# Patient Record
Sex: MALE | Race: WHITE | Employment: FULL TIME | URBAN - METROPOLITAN AREA
[De-identification: names, ages, dates, MRNs, and addresses within clinical notes are randomized per-mention and may not be internally consistent; named-entity substitution may affect disease eponyms.]

---

## 2017-04-06 ENCOUNTER — OFFICE VISIT (OUTPATIENT)
Dept: WOUND CARE | Age: 57
End: 2017-04-06
Payer: COMMERCIAL

## 2017-04-06 ENCOUNTER — HOSPITAL ENCOUNTER (OUTPATIENT)
Dept: LAB | Age: 57
Discharge: HOME | End: 2017-04-06
Payer: COMMERCIAL

## 2017-04-06 VITALS
DIASTOLIC BLOOD PRESSURE: 90 MMHG | WEIGHT: 226.6 LBS | HEART RATE: 75 BPM | TEMPERATURE: 97.4 F | RESPIRATION RATE: 18 BRPM | BODY MASS INDEX: 30.69 KG/M2 | SYSTOLIC BLOOD PRESSURE: 163 MMHG | HEIGHT: 72 IN

## 2017-04-06 DIAGNOSIS — L97.411: ICD-10-CM

## 2017-04-06 DIAGNOSIS — A49.8 BACTERIAL INFECTION DUE TO PSEUDOMONAS: ICD-10-CM

## 2017-04-06 DIAGNOSIS — L97.411: Primary | ICD-10-CM

## 2017-04-06 PROCEDURE — 99212 OFFICE O/P EST SF 10 MIN: CPT | Performed by: PODIATRIST

## 2017-04-06 PROCEDURE — 87205 SMEAR GRAM STAIN: CPT

## 2017-04-06 PROCEDURE — 87070 CULTURE OTHR SPECIMN AEROBIC: CPT

## 2017-04-06 PROCEDURE — 87077 CULTURE AEROBIC IDENTIFY: CPT

## 2017-04-06 PROCEDURE — 87186 SC STD MICRODIL/AGAR DIL: CPT

## 2017-04-08 LAB
CULTURE: ABNORMAL
CULTURE: ABNORMAL
DIRECT EXAM: ABNORMAL
DIRECT EXAM: ABNORMAL
Lab: ABNORMAL
Lab: ABNORMAL
ORGANISM: ABNORMAL
SPECIMEN DESCRIPTION: ABNORMAL
SPECIMEN DESCRIPTION: ABNORMAL
STATUS: ABNORMAL

## 2017-04-11 RX ORDER — CIPROFLOXACIN 500 MG/1
500 TABLET, FILM COATED ORAL 2 TIMES DAILY
Qty: 20 TABLET | Refills: 0 | Status: SHIPPED | OUTPATIENT
Start: 2017-04-11 | End: 2017-04-21

## 2017-04-21 ENCOUNTER — HOSPITAL ENCOUNTER (OUTPATIENT)
Age: 57
Discharge: HOME | End: 2017-04-21
Payer: COMMERCIAL

## 2017-04-21 LAB
ALBUMIN SERPL-MCNC: 4.2 G/DL (ref 3.5–5.2)
ALBUMIN/GLOBULIN RATIO: 1.8 (ref 1–2.5)
ALP BLD-CCNC: 76 U/L (ref 40–129)
ALT SERPL-CCNC: 28 U/L (ref 5–41)
ANION GAP SERPL CALCULATED.3IONS-SCNC: 12 MMOL/L (ref 9–17)
AST SERPL-CCNC: 18 U/L
BILIRUB SERPL-MCNC: 0.38 MG/DL (ref 0.3–1.2)
BUN BLDV-MCNC: 20 MG/DL (ref 6–20)
BUN/CREAT BLD: 22 (ref 9–20)
CALCIUM SERPL-MCNC: 9.1 MG/DL (ref 8.6–10.4)
CHLORIDE BLD-SCNC: 105 MMOL/L (ref 98–107)
CHOLESTEROL/HDL RATIO: 4.4
CHOLESTEROL: 161 MG/DL
CO2: 27 MMOL/L (ref 20–31)
CREAT SERPL-MCNC: 0.91 MG/DL (ref 0.7–1.2)
GFR AFRICAN AMERICAN: >60 ML/MIN
GFR NON-AFRICAN AMERICAN: >60 ML/MIN
GFR SERPL CREATININE-BSD FRML MDRD: ABNORMAL ML/MIN/{1.73_M2}
GFR SERPL CREATININE-BSD FRML MDRD: ABNORMAL ML/MIN/{1.73_M2}
GLUCOSE BLD-MCNC: 114 MG/DL (ref 70–99)
HDLC SERPL-MCNC: 37 MG/DL
LDL CHOLESTEROL: 81 MG/DL (ref 0–130)
POTASSIUM SERPL-SCNC: 4.5 MMOL/L (ref 3.7–5.3)
PROSTATE SPECIFIC ANTIGEN: 4.14 UG/L
SODIUM BLD-SCNC: 144 MMOL/L (ref 135–144)
TOTAL PROTEIN: 6.5 G/DL (ref 6.4–8.3)
TRIGL SERPL-MCNC: 217 MG/DL
VLDLC SERPL CALC-MCNC: ABNORMAL MG/DL (ref 1–30)

## 2017-04-21 PROCEDURE — G0103 PSA SCREENING: HCPCS

## 2017-04-21 PROCEDURE — 80061 LIPID PANEL: CPT

## 2017-04-21 PROCEDURE — 80053 COMPREHEN METABOLIC PANEL: CPT

## 2017-04-21 PROCEDURE — 36415 COLL VENOUS BLD VENIPUNCTURE: CPT

## 2017-07-06 ENCOUNTER — HOSPITAL ENCOUNTER (OUTPATIENT)
Dept: WOUND CARE | Age: 57
Discharge: HOME | End: 2017-07-06
Payer: COMMERCIAL

## 2017-07-06 VITALS
BODY MASS INDEX: 30.69 KG/M2 | SYSTOLIC BLOOD PRESSURE: 158 MMHG | HEART RATE: 76 BPM | HEIGHT: 72 IN | RESPIRATION RATE: 18 BRPM | WEIGHT: 226.6 LBS | TEMPERATURE: 97.2 F | DIASTOLIC BLOOD PRESSURE: 84 MMHG

## 2017-07-06 DIAGNOSIS — L97.411: ICD-10-CM

## 2017-07-06 PROCEDURE — 97597 DBRDMT OPN WND 1ST 20 CM/<: CPT

## 2017-07-06 PROCEDURE — 97597 DBRDMT OPN WND 1ST 20 CM/<: CPT | Performed by: PODIATRIST

## 2017-07-06 PROCEDURE — A6197 ALGINATE DRSG >16 <=48 SQ IN: HCPCS

## 2017-07-20 ENCOUNTER — HOSPITAL ENCOUNTER (OUTPATIENT)
Dept: WOUND CARE | Age: 57
Discharge: HOME | End: 2017-07-20
Payer: COMMERCIAL

## 2017-07-20 VITALS
TEMPERATURE: 98 F | HEART RATE: 73 BPM | DIASTOLIC BLOOD PRESSURE: 79 MMHG | RESPIRATION RATE: 16 BRPM | WEIGHT: 227.1 LBS | SYSTOLIC BLOOD PRESSURE: 129 MMHG | BODY MASS INDEX: 30.8 KG/M2

## 2017-07-20 PROCEDURE — 97597 DBRDMT OPN WND 1ST 20 CM/<: CPT | Performed by: PODIATRIST

## 2017-07-20 PROCEDURE — A6197 ALGINATE DRSG >16 <=48 SQ IN: HCPCS

## 2017-07-20 PROCEDURE — 97597 DBRDMT OPN WND 1ST 20 CM/<: CPT

## 2017-07-20 RX ORDER — ATORVASTATIN CALCIUM 20 MG/1
20 TABLET, FILM COATED ORAL DAILY
COMMUNITY

## 2017-07-20 ASSESSMENT — PAIN SCALES - GENERAL: PAINLEVEL_OUTOF10: 2

## 2017-08-03 ENCOUNTER — HOSPITAL ENCOUNTER (OUTPATIENT)
Dept: WOUND CARE | Age: 57
Discharge: HOME | End: 2017-08-03
Payer: COMMERCIAL

## 2017-08-03 VITALS
BODY MASS INDEX: 30.8 KG/M2 | DIASTOLIC BLOOD PRESSURE: 92 MMHG | HEIGHT: 72 IN | RESPIRATION RATE: 18 BRPM | TEMPERATURE: 97.5 F | SYSTOLIC BLOOD PRESSURE: 154 MMHG | WEIGHT: 227.4 LBS | HEART RATE: 72 BPM

## 2017-08-03 PROCEDURE — 99213 OFFICE O/P EST LOW 20 MIN: CPT

## 2017-08-03 PROCEDURE — 99213 OFFICE O/P EST LOW 20 MIN: CPT | Performed by: PODIATRIST

## 2017-08-03 PROCEDURE — 2720000010 HC SURG SUPPLY STERILE

## 2017-08-03 ASSESSMENT — PAIN SCALES - GENERAL: PAINLEVEL_OUTOF10: 0

## 2017-08-11 ENCOUNTER — TELEPHONE (OUTPATIENT)
Dept: WOUND CARE | Age: 57
End: 2017-08-11

## 2017-08-28 ENCOUNTER — HOSPITAL ENCOUNTER (OUTPATIENT)
Age: 57
Discharge: HOME | End: 2017-08-28
Payer: COMMERCIAL

## 2017-08-28 LAB
ALBUMIN SERPL-MCNC: 3.9 G/DL (ref 3.5–5.2)
ALBUMIN/GLOBULIN RATIO: 1.4 (ref 1–2.5)
ALP BLD-CCNC: 78 U/L (ref 40–129)
ALT SERPL-CCNC: 27 U/L (ref 5–41)
ANION GAP SERPL CALCULATED.3IONS-SCNC: 12 MMOL/L (ref 9–17)
AST SERPL-CCNC: 18 U/L
BILIRUB SERPL-MCNC: 0.25 MG/DL (ref 0.3–1.2)
BUN BLDV-MCNC: 18 MG/DL (ref 6–20)
BUN/CREAT BLD: 23 (ref 9–20)
CALCIUM SERPL-MCNC: 9.3 MG/DL (ref 8.6–10.4)
CHLORIDE BLD-SCNC: 107 MMOL/L (ref 98–107)
CHOLESTEROL/HDL RATIO: 5.1
CHOLESTEROL: 162 MG/DL
CO2: 24 MMOL/L (ref 20–31)
CREAT SERPL-MCNC: 0.8 MG/DL (ref 0.7–1.2)
ESTIMATED AVERAGE GLUCOSE: 108 MG/DL
GFR AFRICAN AMERICAN: >60 ML/MIN
GFR NON-AFRICAN AMERICAN: >60 ML/MIN
GFR SERPL CREATININE-BSD FRML MDRD: ABNORMAL ML/MIN/{1.73_M2}
GFR SERPL CREATININE-BSD FRML MDRD: ABNORMAL ML/MIN/{1.73_M2}
GLUCOSE BLD-MCNC: 83 MG/DL (ref 70–99)
HBA1C MFR BLD: 5.4 % (ref 4.8–5.9)
HDLC SERPL-MCNC: 32 MG/DL
LDL CHOLESTEROL: 77 MG/DL (ref 0–130)
POTASSIUM SERPL-SCNC: 4.1 MMOL/L (ref 3.7–5.3)
SODIUM BLD-SCNC: 143 MMOL/L (ref 135–144)
TOTAL PROTEIN: 6.7 G/DL (ref 6.4–8.3)
TRIGL SERPL-MCNC: 263 MG/DL
VLDLC SERPL CALC-MCNC: ABNORMAL MG/DL (ref 1–30)

## 2017-08-28 PROCEDURE — 80053 COMPREHEN METABOLIC PANEL: CPT

## 2017-08-28 PROCEDURE — 36415 COLL VENOUS BLD VENIPUNCTURE: CPT

## 2017-08-28 PROCEDURE — 83036 HEMOGLOBIN GLYCOSYLATED A1C: CPT

## 2017-08-28 PROCEDURE — 80061 LIPID PANEL: CPT

## 2017-09-14 ENCOUNTER — HOSPITAL ENCOUNTER (OUTPATIENT)
Dept: WOUND CARE | Age: 57
Discharge: HOME | End: 2017-09-14
Payer: COMMERCIAL

## 2017-09-14 VITALS
WEIGHT: 231.2 LBS | RESPIRATION RATE: 16 BRPM | SYSTOLIC BLOOD PRESSURE: 149 MMHG | TEMPERATURE: 97.4 F | HEIGHT: 72 IN | BODY MASS INDEX: 31.31 KG/M2 | HEART RATE: 72 BPM | DIASTOLIC BLOOD PRESSURE: 84 MMHG

## 2017-09-14 PROCEDURE — 99213 OFFICE O/P EST LOW 20 MIN: CPT

## 2017-09-14 PROCEDURE — A6197 ALGINATE DRSG >16 <=48 SQ IN: HCPCS

## 2017-09-14 PROCEDURE — 99212 OFFICE O/P EST SF 10 MIN: CPT | Performed by: PODIATRIST

## 2017-09-14 ASSESSMENT — PAIN SCALES - GENERAL: PAINLEVEL_OUTOF10: 3

## 2017-11-09 ENCOUNTER — HOSPITAL ENCOUNTER (OUTPATIENT)
Age: 57
Discharge: HOME | End: 2017-11-09
Payer: COMMERCIAL

## 2017-11-09 ENCOUNTER — HOSPITAL ENCOUNTER (OUTPATIENT)
Dept: WOUND CARE | Age: 57
Discharge: HOME | End: 2017-11-09
Payer: COMMERCIAL

## 2017-11-09 ENCOUNTER — HOSPITAL ENCOUNTER (OUTPATIENT)
Dept: LAB | Age: 57
Discharge: HOME | End: 2017-11-09
Payer: COMMERCIAL

## 2017-11-09 ENCOUNTER — HOSPITAL ENCOUNTER (OUTPATIENT)
Dept: GENERAL RADIOLOGY | Age: 57
Discharge: HOME | End: 2017-11-09
Payer: COMMERCIAL

## 2017-11-09 VITALS
WEIGHT: 238 LBS | BODY MASS INDEX: 32.23 KG/M2 | HEART RATE: 83 BPM | HEIGHT: 72 IN | SYSTOLIC BLOOD PRESSURE: 151 MMHG | TEMPERATURE: 97.7 F | RESPIRATION RATE: 18 BRPM | DIASTOLIC BLOOD PRESSURE: 86 MMHG

## 2017-11-09 DIAGNOSIS — L97.411: ICD-10-CM

## 2017-11-09 DIAGNOSIS — L97.411: Primary | ICD-10-CM

## 2017-11-09 PROCEDURE — 87186 SC STD MICRODIL/AGAR DIL: CPT

## 2017-11-09 PROCEDURE — 87077 CULTURE AEROBIC IDENTIFY: CPT

## 2017-11-09 PROCEDURE — 99213 OFFICE O/P EST LOW 20 MIN: CPT | Performed by: PODIATRIST

## 2017-11-09 PROCEDURE — 73630 X-RAY EXAM OF FOOT: CPT

## 2017-11-09 PROCEDURE — 99213 OFFICE O/P EST LOW 20 MIN: CPT

## 2017-11-09 PROCEDURE — 87070 CULTURE OTHR SPECIMN AEROBIC: CPT

## 2017-11-09 PROCEDURE — 87205 SMEAR GRAM STAIN: CPT

## 2017-11-09 ASSESSMENT — PAIN DESCRIPTION - PAIN TYPE: TYPE: CHRONIC PAIN

## 2017-11-09 ASSESSMENT — PAIN DESCRIPTION - ORIENTATION: ORIENTATION: RIGHT

## 2017-11-09 ASSESSMENT — PAIN DESCRIPTION - FREQUENCY: FREQUENCY: INTERMITTENT

## 2017-11-09 ASSESSMENT — PAIN DESCRIPTION - DESCRIPTORS: DESCRIPTORS: ACHING

## 2017-11-09 ASSESSMENT — PAIN DESCRIPTION - LOCATION: LOCATION: FOOT

## 2017-11-09 NOTE — PLAN OF CARE
Problem: Smoking cessation:  Goal: Ability to formulate a plan to maintain a tobacco-free life will be supported  Ability to formulate a plan to maintain a tobacco-free life will be supported   Outcome: Ongoing

## 2017-11-10 NOTE — PROGRESS NOTES
Diabetic Quality Measure Benchmarks:    FSBS:  RESULT did not do           Hemoglobin A1c Controlled and is < 8%     Yes  LDL is <100        5.4    In 8/17                                       Yes  Blood Pressure is under < 140/90               No  Tobacco Non use                                         No  Aspirin Use                                                    Yes    Information sent to PCP                                Yes    Instructions given to Patient regarding follow up and or education      Yes
Wound Management Medical Nutrition Therapy Follow-Up Chart Review    Wt Readings from Last 3 Encounters:   11/09/17 238 lb (108 kg)   09/14/17 231 lb 3.2 oz (104.9 kg)   08/03/17 227 lb 6.4 oz (103.1 kg)     Significant Weight Change- No   Unintended- Yes    Patient atorvastatin and ibuprofen    Labs -   Lab Results   Component Value Date    LABALBU 3.9 08/28/2017    GLUCOSE 83 08/28/2017    LABA1C 5.4 08/28/2017     Other Labs - none    Glucometer readings at home are running unknownd. Diabetes Control- adequate per A1c    Dietary Recall Reveals- unable to assess as chart review. Medical Nutrition Therapy Discussed- No vitamin noted. In July when I spoke to him, I had encouraged use of MVI with minerals, protein foods, and increased water consumption. Nutrition Therapy Recommendations- same as above.         Follow-up- quarterly    Melanie Adams RDN, LD 11/9/2017 9:57 AM      PQRS Measure: #1(Diabetes: Hemoglobin A1C Poor Control) - no   PQRS Measure #130 (Documentation of Current Medications in Medical Record) - yes
numbness/tingling,claudication,smoking                                          Positive for amputation ; partial of foot                                                              Frostbite                                                              Pain, increasing after fall    Multidisciplinary assessment by members of the wound care team is carried out, including vitals and check of review of systems and accepted. PE:   VSS, Afebrile, NAD, A&O x 3, ambulatory ; bipedal , plantigrade , non propulsive / steppage   RLE: TMA stump -- FT ulceration ; increased with erosion and biofilm                                     Low grade erythema                                     +PMT on direct exam and treatment                                     TWV increased 20%                                    Size documentation  / photo comparison             X-ray -- no acute s/s Fx , infection , degeneration                           No interval changes v. Previous 9- views       Wound Care Documentation   Wound 07/06/17 Foot Right #1 right lateral foot (Active)   Wound Image   11/9/2017  8:38 AM   Wound Type Wound 11/9/2017  8:38 AM   Wound Burn 11/9/2017  8:38 AM   Dressing Status Old drainage 11/9/2017  8:38 AM   Dressing Changed Changed/New 11/9/2017  8:56 AM   Dressing/Treatment Dry dressing 11/9/2017  8:38 AM   Wound Cleansed Rinsed/Irrigated with saline 11/9/2017  8:38 AM   Wound Length (cm) 5.7 cm 11/9/2017  8:38 AM   Wound Width (cm) 3 cm 11/9/2017  8:38 AM   Wound Depth (cm)  0.7 11/9/2017  8:38 AM   Calculated Wound Size (cm^2) (l*w) 17.1 cm^2 11/9/2017  8:38 AM   Change in Wound Size % (l*w) 5.79 11/9/2017  8:38 AM   Wound Assessment Edges well approximated;Red 11/9/2017  8:38 AM   Margins Attached edges; Defined edges 11/9/2017  8:38 AM   Pearl-wound Assessment Edema;Blanchable erythema 11/9/2017  8:38 AM   [place]%Wound Bed 75 11/9/2017  8:38 AM   Red%Wound Bed 100 9/14/2017  9:07 AM   Yellow%Wound Bed 25

## 2017-11-30 ENCOUNTER — HOSPITAL ENCOUNTER (OUTPATIENT)
Dept: WOUND CARE | Age: 57
Discharge: HOME | End: 2017-11-30
Payer: COMMERCIAL

## 2017-11-30 VITALS
BODY MASS INDEX: 32.02 KG/M2 | TEMPERATURE: 98.5 F | RESPIRATION RATE: 18 BRPM | WEIGHT: 236.1 LBS | HEART RATE: 75 BPM | DIASTOLIC BLOOD PRESSURE: 75 MMHG | SYSTOLIC BLOOD PRESSURE: 147 MMHG

## 2017-11-30 DIAGNOSIS — A49.8 BACTERIAL INFECTION DUE TO PSEUDOMONAS: ICD-10-CM

## 2017-11-30 PROCEDURE — 97597 DBRDMT OPN WND 1ST 20 CM/<: CPT | Performed by: PODIATRIST

## 2017-11-30 PROCEDURE — 97597 DBRDMT OPN WND 1ST 20 CM/<: CPT

## 2017-11-30 RX ORDER — CIPROFLOXACIN 500 MG/1
500 TABLET, FILM COATED ORAL 2 TIMES DAILY
Qty: 20 TABLET | Refills: 0 | Status: SHIPPED | OUTPATIENT
Start: 2017-11-30 | End: 2017-12-10

## 2017-11-30 ASSESSMENT — PAIN SCALES - GENERAL: PAINLEVEL_OUTOF10: 2

## 2017-11-30 NOTE — PROGRESS NOTES
Miranda J[facility] 37   Progress Note and Procedure Note      [address] [facility] RECORD NUMBER:  [id number]  AGE: 62 y.o. GENDER: male  : 1960  EPISODE DATE:  2017    Subjective:     Chief Complaint   Patient presents with    Wound Check     Right lateral foot    pain in area      HISTORY of PRESENT ILLNESS HPI     Latia Dugan is a 62 y.o. male who presents today for wound/ulcer evaluation. History of Wound Context: Right foot ; TMA stump -- > 3 months ; recurrent  / refractory                                              Some question of local perfusion - effective  ; CO2 @ work                                              Local Ag wound care daily                                              Hx frostbite  ; TMA                                             Hx autonomic sympathectomy   Wound/Ulcer Pain Timing/Severity: intermittent, moderate  Quality of pain: sharp, burning, throbbing  Severity:  3 / 10   Modifying Factors: Pain worsens with walking and Pain worsens with direct shoe pressure   Associated Signs/Symptoms: erythema, drainage, odor and pain    Ulcer Identification:  Ulcer Type: specialized post frostbite and neuropathic   Contributing Factors: decreased tissue oxygenation and sympathectomy     Wound: Blister        PAST MEDICAL HISTORY        Diagnosis Date    Autonomic nervous system disorder     Frostbite     History of femoral angiogram        PAST SURGICAL HISTORY    Past Surgical History:   Procedure Laterality Date    FEMORAL BYPASS  2016    FOOT AMPUTATION THROUGH METATARSAL      times two    SYMPATHECTOMY         FAMILY HISTORY    History reviewed. No pertinent family history.     SOCIAL HISTORY    Social History   Substance Use Topics    Smoking status: Former Smoker     Packs/day: 0.50     Quit date: 2016    Smokeless tobacco: Never Used    Alcohol use Not on file       ALLERGIES    No Known Allergies    MEDICATIONS    Current Outpatient Prescriptions on File Prior to Encounter   Medication Sig Dispense Refill    atorvastatin (LIPITOR) 20 MG tablet Take 20 mg by mouth daily      ibuprofen (ADVIL;MOTRIN) 800 MG tablet Take 800 mg by mouth daily        No current facility-administered medications on file prior to encounter. REVIEW OF SYSTEMS    Pertinent items are noted in HPI.   -claudication , night or rest pain , DVT, angina    +TMA , +frostbite , +autonomic sympathectomy     Objective:      BP (!) 147/75 Comment: 2nd reading-Pt instructed to f/u with PCP  Pulse 75   Temp 98.5 °F (36.9 °C) (Tympanic)   Resp 18   Wt 236 lb 1.6 oz (107.1 kg)   BMI 32.02 kg/m²     Wt Readings from Last 3 Encounters:   11/30/17 236 lb 1.6 oz (107.1 kg)   11/09/17 238 lb (108 kg)   09/14/17 231 lb 3.2 oz (104.9 kg)       PHYSICAL EXAM    General Appearance: alert and oriented to person, place and time, well-developed and well nourished and in mild distress, with exam (aniety)  Musculoskeletal: TMA B/L   Neurologic: reflexes normal and symmetric, sensation to light touch intact, vibratory sensation normal and Babinski sign negative                     Abnormal sweating mechanism   Skin : residual FT ulceration , with biofilm , granular bed underneath                                                   +macerated edges                                                   +PMT on direct exam and treatment                                                   TWV decreased < 5 %                                                   Size documentation / photo comparison   Labs : culture -- light Pseudomonas        Assessment:    FT ulceration of right foot ; stagnant    microbial isolate noted   Complicating features of current work environment , frostbite hx and Autonomic sympathectomy     Patient Active Problem List   Diagnosis Code    Chronic ulcer of right midfoot limited to breakdown of skin ([facility] [address] L97.411    Bacterial infection due to Pseudomonas B96.5     REC: 11/30/2017  8:55 AM   Debridement per physician None 11/9/2017  8:56 AM   Time out Yes 7/20/2017  8:50 AM   Procedural Pain 0 7/6/2017 10:32 AM   Post procedural Pain 0 7/6/2017 10:32 AM   Number of days: 147       Percent of Wound(s)/Ulcer(s) Debrided: 100%    Total Surface Area Debrided:  15.2 sq cm       Diabetic/Pressure/Non Pressure Ulcers only:  Ulcer: Non-Pressure ulcer, limited to breakdown of skin      Estimated Blood Loss:  Minimal    Hemostasis Achieved:  by pressure    Procedural Pain:  3  / 10     Post Procedural Pain:  1 / 10     Response to treatment:  With complaints of pain. Plan:     Treatment Note please see attached Discharge Instructions    Written patient dismissal instructions given to patient and signed by patient or POA. Discharge Instructions         Wound Management Patient Discharge Instructions    CALL [phone] for questions regarding care of your wounds. OFFICE HOURS ARE WED AND Thursday 8 A. M. TO 4:30 P. M. And subject to change without notice    Discharge instructions for the patient's plan of care. Wound care: Cleanse area with white vinegar and apply cutimed dressing to right lateral foot. May cover with gauze dressing as needed. Change dressing daily. May dab with white vinegar and leave open to air in the evening. Cipro 500 mg take one tablet 2 times daily for 10 days. Return to clinic in 2 weeks.     Next appointment:  Future Appointments  Date Time Provider Anatoliy Espinoza   12/14/2017 9:15 AM Thong Mace DPM [facility] [place]         Comments:                  Electronically signed by Thong Mace DPM on 11/30/2017 at 9:28 AM

## 2017-12-14 ENCOUNTER — HOSPITAL ENCOUNTER (OUTPATIENT)
Dept: WOUND CARE | Age: 57
Discharge: HOME | End: 2017-12-14
Payer: COMMERCIAL

## 2017-12-14 VITALS
HEART RATE: 79 BPM | TEMPERATURE: 98.6 F | SYSTOLIC BLOOD PRESSURE: 145 MMHG | RESPIRATION RATE: 16 BRPM | WEIGHT: 236.8 LBS | BODY MASS INDEX: 32.12 KG/M2 | DIASTOLIC BLOOD PRESSURE: 80 MMHG

## 2017-12-14 PROCEDURE — 97597 DBRDMT OPN WND 1ST 20 CM/<: CPT

## 2017-12-14 PROCEDURE — 97597 DBRDMT OPN WND 1ST 20 CM/<: CPT | Performed by: PODIATRIST

## 2017-12-14 ASSESSMENT — PAIN SCALES - GENERAL: PAINLEVEL_OUTOF10: 2

## 2017-12-14 NOTE — PROGRESS NOTES
Yes    Pain Control:         Debridement:Non-excisional Debridement    Using curette, #15 blade scalpel and forceps the wound(s)/ulcer(s) was/were sharply debrided down through and including the removal of epidermis and dermis. Devitalized Tissue Debrided:  biofilm    Pre Debridement Measurements:  Are located in the [facility]  Documentation Flow Sheet    Wound/Ulcer #: 1    Post Debridement Measurements:  Wound/Ulcer Descriptions are Pre Debridement except measurements:    Wound 07/06/17 Foot Right #1 right lateral foot (Active)   Wound Image   12/14/2017  9:33 AM   Wound Type Wound 12/14/2017  9:02 AM   Wound Burn 11/9/2017  8:38 AM   Dressing Status Old drainage 12/14/2017  9:02 AM   Dressing Changed Changed/New 12/14/2017  9:02 AM   Dressing/Treatment Other (Comment) 12/14/2017  9:33 AM   Wound Cleansed Rinsed/Irrigated with saline 11/9/2017  8:38 AM   Wound Length (cm) 2.2 cm 12/14/2017  9:02 AM   Wound Width (cm) 5 cm 12/14/2017  9:02 AM   Wound Depth (cm)  1 12/14/2017  9:02 AM   Calculated Wound Size (cm^2) (l*w) 11 cm^2 12/14/2017  9:02 AM   Change in Wound Size % (l*w) 39.39 12/14/2017  9:02 AM   Wound Assessment Red 12/14/2017  9:02 AM   Drainage Amount Small 12/14/2017  9:02 AM   Drainage Description Serosanguinous 12/14/2017  9:02 AM   Odor None 12/14/2017  9:02 AM   Margins Attached edges; Defined edges 11/9/2017  8:38 AM   Pearl-wound Assessment Calloused;Clean;Dry 12/14/2017  9:02 AM   [place]%Wound Bed 75 11/9/2017  8:38 AM   Red%Wound Bed 100 9/14/2017  9:07 AM   Yellow%Wound Bed 25 11/9/2017  8:38 AM   Culture Taken No 12/14/2017  9:33 AM   Debridement per physician None 11/9/2017  8:56 AM   Time out Yes 7/20/2017  8:50 AM   Procedural Pain 0 7/6/2017 10:32 AM   Post procedural Pain 0 7/6/2017 10:32 AM   Number of days: 161       Percent of Wound(s)/Ulcer(s) Debrided: 90%    Total Surface Area Debrided:  10.5 sq cm       Diabetic/Pressure/Non Pressure Ulcers only:  Ulcer: Non-Pressure ulcer, limited to breakdown of skin      Estimated Blood Loss:  Minimal    Hemostasis Achieved:  by pressure    Procedural Pain:  2  / 10     Post Procedural Pain:  0 / 10     Response to treatment:  With complaints of pain. Plan:     Treatment Note please see attached Discharge Instructions    Written patient dismissal instructions given to patient and signed by patient or POA. Discharge Instructions       Wound Management Patient Discharge Instructions    CALL [phone] for questions regarding care of your wounds. OFFICE HOURS ARE WED AND Thursday 8 A. M. TO 4:30 P. M. And subject to change without notice    Discharge instructions for the patient's plan of care. Wound care: Cleanse area with white vinegar or mild bleach solution. Apply cutimed sorbact to right lateral foot. Cover with dry gauze dressing. Change dressing daily or as needed. Return to clinic in 7 weeks. Next appointment: Thursday, February 1, 2018 @ 9:00 am  No future appointments.       Comments:                  Electronically signed by Maverick Potter DPM on 12/14/2017 at 10:15 AM

## 2018-01-29 ENCOUNTER — HOSPITAL ENCOUNTER (OUTPATIENT)
Age: 58
Discharge: HOME | End: 2018-01-29
Payer: COMMERCIAL

## 2018-01-29 LAB
ALBUMIN SERPL-MCNC: 4 G/DL (ref 3.5–5.2)
ALBUMIN/GLOBULIN RATIO: 1.6 (ref 1–2.5)
ALP BLD-CCNC: 78 U/L (ref 40–129)
ALT SERPL-CCNC: 25 U/L (ref 5–41)
ANION GAP SERPL CALCULATED.3IONS-SCNC: 12 MMOL/L (ref 9–17)
AST SERPL-CCNC: 17 U/L
BILIRUB SERPL-MCNC: 0.22 MG/DL (ref 0.3–1.2)
BUN BLDV-MCNC: 19 MG/DL (ref 6–20)
BUN/CREAT BLD: 20 (ref 9–20)
CALCIUM SERPL-MCNC: 9.1 MG/DL (ref 8.6–10.4)
CHLORIDE BLD-SCNC: 102 MMOL/L (ref 98–107)
CHOLESTEROL/HDL RATIO: 4.9
CHOLESTEROL: 162 MG/DL
CO2: 28 MMOL/L (ref 20–31)
CREAT SERPL-MCNC: 0.97 MG/DL (ref 0.7–1.2)
GFR AFRICAN AMERICAN: >60 ML/MIN
GFR NON-AFRICAN AMERICAN: >60 ML/MIN
GFR SERPL CREATININE-BSD FRML MDRD: ABNORMAL ML/MIN/{1.73_M2}
GFR SERPL CREATININE-BSD FRML MDRD: ABNORMAL ML/MIN/{1.73_M2}
GLUCOSE BLD-MCNC: 113 MG/DL (ref 70–99)
HDLC SERPL-MCNC: 33 MG/DL
LDL CHOLESTEROL: 88 MG/DL (ref 0–130)
POTASSIUM SERPL-SCNC: 4.4 MMOL/L (ref 3.7–5.3)
PROSTATE SPECIFIC ANTIGEN: 5.25 UG/L
SODIUM BLD-SCNC: 142 MMOL/L (ref 135–144)
TOTAL PROTEIN: 6.5 G/DL (ref 6.4–8.3)
TRIGL SERPL-MCNC: 204 MG/DL
VLDLC SERPL CALC-MCNC: ABNORMAL MG/DL (ref 1–30)

## 2018-01-29 PROCEDURE — 80053 COMPREHEN METABOLIC PANEL: CPT

## 2018-01-29 PROCEDURE — 36415 COLL VENOUS BLD VENIPUNCTURE: CPT

## 2018-01-29 PROCEDURE — 80061 LIPID PANEL: CPT

## 2018-01-29 PROCEDURE — G0103 PSA SCREENING: HCPCS

## 2018-02-01 ENCOUNTER — HOSPITAL ENCOUNTER (OUTPATIENT)
Dept: WOUND CARE | Age: 58
Discharge: HOME | End: 2018-02-01
Payer: COMMERCIAL

## 2018-02-01 VITALS
BODY MASS INDEX: 31.94 KG/M2 | HEIGHT: 72 IN | RESPIRATION RATE: 18 BRPM | SYSTOLIC BLOOD PRESSURE: 137 MMHG | HEART RATE: 70 BPM | TEMPERATURE: 97.8 F | WEIGHT: 235.8 LBS | DIASTOLIC BLOOD PRESSURE: 82 MMHG

## 2018-02-01 PROCEDURE — 97597 DBRDMT OPN WND 1ST 20 CM/<: CPT

## 2018-02-01 PROCEDURE — 97597 DBRDMT OPN WND 1ST 20 CM/<: CPT | Performed by: PODIATRIST

## 2018-02-01 ASSESSMENT — PAIN SCALES - GENERAL: PAINLEVEL_OUTOF10: 0

## 2018-02-19 ENCOUNTER — HOSPITAL ENCOUNTER (OUTPATIENT)
Age: 58
Discharge: HOME | End: 2018-02-19
Payer: COMMERCIAL

## 2018-02-19 LAB
ALBUMIN SERPL-MCNC: 4 G/DL (ref 3.5–5.2)
ALBUMIN/GLOBULIN RATIO: 1.5 (ref 1–2.5)
ALP BLD-CCNC: 72 U/L (ref 40–129)
ALT SERPL-CCNC: 20 U/L (ref 5–41)
ANION GAP SERPL CALCULATED.3IONS-SCNC: 11 MMOL/L (ref 9–17)
AST SERPL-CCNC: 16 U/L
BILIRUB SERPL-MCNC: 0.24 MG/DL (ref 0.3–1.2)
BUN BLDV-MCNC: 22 MG/DL (ref 6–20)
BUN/CREAT BLD: 25 (ref 9–20)
CALCIUM SERPL-MCNC: 9.6 MG/DL (ref 8.6–10.4)
CHLORIDE BLD-SCNC: 105 MMOL/L (ref 98–107)
CHOLESTEROL/HDL RATIO: 5.3
CHOLESTEROL: 170 MG/DL
CO2: 27 MMOL/L (ref 20–31)
CREAT SERPL-MCNC: 0.87 MG/DL (ref 0.7–1.2)
GFR AFRICAN AMERICAN: >60 ML/MIN
GFR NON-AFRICAN AMERICAN: >60 ML/MIN
GFR SERPL CREATININE-BSD FRML MDRD: ABNORMAL ML/MIN/{1.73_M2}
GFR SERPL CREATININE-BSD FRML MDRD: ABNORMAL ML/MIN/{1.73_M2}
GLUCOSE BLD-MCNC: 87 MG/DL (ref 70–99)
HDLC SERPL-MCNC: 32 MG/DL
LDL CHOLESTEROL: 84 MG/DL (ref 0–130)
POTASSIUM SERPL-SCNC: 4.1 MMOL/L (ref 3.7–5.3)
PROSTATE SPECIFIC ANTIGEN: 5.54 UG/L
SODIUM BLD-SCNC: 143 MMOL/L (ref 135–144)
TOTAL PROTEIN: 6.6 G/DL (ref 6.4–8.3)
TRIGL SERPL-MCNC: 268 MG/DL
VLDLC SERPL CALC-MCNC: ABNORMAL MG/DL (ref 1–30)

## 2018-02-19 PROCEDURE — 36415 COLL VENOUS BLD VENIPUNCTURE: CPT

## 2018-02-19 PROCEDURE — 80053 COMPREHEN METABOLIC PANEL: CPT

## 2018-02-19 PROCEDURE — 84153 ASSAY OF PSA TOTAL: CPT

## 2018-02-19 PROCEDURE — 80061 LIPID PANEL: CPT

## 2018-03-13 ENCOUNTER — HOSPITAL ENCOUNTER (OUTPATIENT)
Age: 58
Setting detail: SPECIMEN
Discharge: HOME | End: 2018-03-13
Payer: COMMERCIAL

## 2018-03-13 ENCOUNTER — OFFICE VISIT (OUTPATIENT)
Dept: UROLOGY | Age: 58
End: 2018-03-13
Payer: COMMERCIAL

## 2018-03-13 VITALS
HEIGHT: 72 IN | DIASTOLIC BLOOD PRESSURE: 80 MMHG | SYSTOLIC BLOOD PRESSURE: 130 MMHG | WEIGHT: 238 LBS | BODY MASS INDEX: 32.23 KG/M2

## 2018-03-13 DIAGNOSIS — R97.20 ELEVATED PSA: ICD-10-CM

## 2018-03-13 DIAGNOSIS — R97.20 ELEVATED PSA: Primary | ICD-10-CM

## 2018-03-13 LAB

## 2018-03-13 PROCEDURE — 81001 URINALYSIS AUTO W/SCOPE: CPT

## 2018-03-13 PROCEDURE — 87086 URINE CULTURE/COLONY COUNT: CPT

## 2018-03-13 PROCEDURE — 99204 OFFICE O/P NEW MOD 45 MIN: CPT | Performed by: NURSE PRACTITIONER

## 2018-03-13 RX ORDER — CIPROFLOXACIN 500 MG/1
500 TABLET, FILM COATED ORAL 2 TIMES DAILY
Qty: 6 TABLET | Refills: 0 | Status: SHIPPED | OUTPATIENT
Start: 2018-03-13 | End: 2018-03-16

## 2018-03-13 ASSESSMENT — ENCOUNTER SYMPTOMS
CONSTIPATION: 0
SHORTNESS OF BREATH: 0
BACK PAIN: 0
EYE REDNESS: 0
ABDOMINAL PAIN: 0
VOMITING: 0
COLOR CHANGE: 0
WHEEZING: 0
COUGH: 0
NAUSEA: 0
EYE PAIN: 0

## 2018-03-14 LAB
CULTURE: NO GROWTH
CULTURE: NORMAL
Lab: NORMAL
Lab: NORMAL
SPECIMEN DESCRIPTION: NORMAL
SPECIMEN DESCRIPTION: NORMAL
STATUS: NORMAL

## 2018-04-04 ENCOUNTER — PROCEDURE VISIT (OUTPATIENT)
Dept: UROLOGY | Age: 58
End: 2018-04-04
Payer: COMMERCIAL

## 2018-04-04 ENCOUNTER — HOSPITAL ENCOUNTER (OUTPATIENT)
Age: 58
Setting detail: SPECIMEN
Discharge: HOME | End: 2018-04-04
Payer: COMMERCIAL

## 2018-04-04 VITALS
TEMPERATURE: 98.2 F | DIASTOLIC BLOOD PRESSURE: 82 MMHG | BODY MASS INDEX: 31.69 KG/M2 | WEIGHT: 234 LBS | HEIGHT: 72 IN | SYSTOLIC BLOOD PRESSURE: 130 MMHG

## 2018-04-04 DIAGNOSIS — R97.20 ELEVATED PSA: Primary | ICD-10-CM

## 2018-04-04 PROCEDURE — 55700 PR BIOPSY OF PROSTATE,NEEDLE/PUNCH: CPT | Performed by: UROLOGY

## 2018-04-04 PROCEDURE — 99213 OFFICE O/P EST LOW 20 MIN: CPT | Performed by: UROLOGY

## 2018-04-04 PROCEDURE — 88305 TISSUE EXAM BY PATHOLOGIST: CPT

## 2018-04-04 PROCEDURE — 76942 ECHO GUIDE FOR BIOPSY: CPT | Performed by: UROLOGY

## 2018-04-04 RX ORDER — CIPROFLOXACIN 500 MG/1
500 TABLET, FILM COATED ORAL 2 TIMES DAILY
COMMUNITY
End: 2018-04-12 | Stop reason: ALTCHOICE

## 2018-04-04 ASSESSMENT — ENCOUNTER SYMPTOMS
WHEEZING: 0
EYE PAIN: 0
VOMITING: 0
ABDOMINAL PAIN: 0
SHORTNESS OF BREATH: 0
COLOR CHANGE: 0
NAUSEA: 0
COUGH: 0
BACK PAIN: 0
EYE REDNESS: 0

## 2018-04-05 LAB — SURGICAL PATHOLOGY REPORT: NORMAL

## 2018-04-12 ENCOUNTER — OFFICE VISIT (OUTPATIENT)
Dept: UROLOGY | Age: 58
End: 2018-04-12
Payer: COMMERCIAL

## 2018-04-12 VITALS
DIASTOLIC BLOOD PRESSURE: 70 MMHG | BODY MASS INDEX: 32.05 KG/M2 | WEIGHT: 236.6 LBS | HEIGHT: 72 IN | SYSTOLIC BLOOD PRESSURE: 136 MMHG

## 2018-04-12 DIAGNOSIS — R97.20 ELEVATED PSA: Primary | ICD-10-CM

## 2018-04-12 PROCEDURE — 99213 OFFICE O/P EST LOW 20 MIN: CPT | Performed by: UROLOGY

## 2018-05-03 ENCOUNTER — HOSPITAL ENCOUNTER (OUTPATIENT)
Dept: WOUND CARE | Age: 58
Discharge: HOME | End: 2018-05-03
Payer: COMMERCIAL

## 2018-05-03 VITALS
SYSTOLIC BLOOD PRESSURE: 128 MMHG | HEART RATE: 78 BPM | DIASTOLIC BLOOD PRESSURE: 77 MMHG | RESPIRATION RATE: 18 BRPM | TEMPERATURE: 97.9 F | HEIGHT: 72 IN | WEIGHT: 228 LBS | BODY MASS INDEX: 30.88 KG/M2

## 2018-05-03 PROCEDURE — 97597 DBRDMT OPN WND 1ST 20 CM/<: CPT

## 2018-05-03 PROCEDURE — 97597 DBRDMT OPN WND 1ST 20 CM/<: CPT | Performed by: PODIATRIST

## 2018-05-03 ASSESSMENT — PAIN SCALES - GENERAL: PAINLEVEL_OUTOF10: 0

## 2018-08-14 ENCOUNTER — HOSPITAL ENCOUNTER (OUTPATIENT)
Age: 58
Discharge: HOME | End: 2018-08-14
Payer: COMMERCIAL

## 2018-08-14 LAB
ALBUMIN SERPL-MCNC: 4.3 G/DL (ref 3.5–5.2)
ALBUMIN/GLOBULIN RATIO: 1.6 (ref 1–2.5)
ALP BLD-CCNC: 97 U/L (ref 40–129)
ALT SERPL-CCNC: 28 U/L (ref 5–41)
ANION GAP SERPL CALCULATED.3IONS-SCNC: 10 MMOL/L (ref 9–17)
AST SERPL-CCNC: 19 U/L
BILIRUB SERPL-MCNC: 0.29 MG/DL (ref 0.3–1.2)
BUN BLDV-MCNC: 27 MG/DL (ref 6–20)
BUN/CREAT BLD: 31 (ref 9–20)
CALCIUM SERPL-MCNC: 9.7 MG/DL (ref 8.6–10.4)
CHLORIDE BLD-SCNC: 104 MMOL/L (ref 98–107)
CHOLESTEROL/HDL RATIO: 5.5
CHOLESTEROL: 176 MG/DL
CO2: 27 MMOL/L (ref 20–31)
CREAT SERPL-MCNC: 0.86 MG/DL (ref 0.7–1.2)
GFR AFRICAN AMERICAN: >60 ML/MIN
GFR NON-AFRICAN AMERICAN: >60 ML/MIN
GFR SERPL CREATININE-BSD FRML MDRD: ABNORMAL ML/MIN/{1.73_M2}
GFR SERPL CREATININE-BSD FRML MDRD: ABNORMAL ML/MIN/{1.73_M2}
GLUCOSE BLD-MCNC: 106 MG/DL (ref 70–99)
HDLC SERPL-MCNC: 32 MG/DL
LDL CHOLESTEROL: 81 MG/DL (ref 0–130)
POTASSIUM SERPL-SCNC: 4.2 MMOL/L (ref 3.7–5.3)
SODIUM BLD-SCNC: 141 MMOL/L (ref 135–144)
TOTAL PROTEIN: 7 G/DL (ref 6.4–8.3)
TRIGL SERPL-MCNC: 317 MG/DL
VLDLC SERPL CALC-MCNC: ABNORMAL MG/DL (ref 1–30)

## 2018-08-14 PROCEDURE — 80061 LIPID PANEL: CPT

## 2018-08-14 PROCEDURE — 36415 COLL VENOUS BLD VENIPUNCTURE: CPT

## 2018-08-14 PROCEDURE — 80053 COMPREHEN METABOLIC PANEL: CPT

## 2018-10-26 ENCOUNTER — TELEPHONE (OUTPATIENT)
Dept: UROLOGY | Age: 58
End: 2018-10-26

## 2019-07-05 ENCOUNTER — HOSPITAL ENCOUNTER (OUTPATIENT)
Age: 59
Discharge: HOME | End: 2019-07-05
Payer: COMMERCIAL

## 2019-07-05 LAB
ALBUMIN SERPL-MCNC: 4.1 G/DL (ref 3.5–5.2)
ALBUMIN/GLOBULIN RATIO: 1.6 (ref 1–2.5)
ALP BLD-CCNC: 91 U/L (ref 40–129)
ALT SERPL-CCNC: 22 U/L (ref 5–41)
ANION GAP SERPL CALCULATED.3IONS-SCNC: 10 MMOL/L (ref 9–17)
AST SERPL-CCNC: 15 U/L
BILIRUB SERPL-MCNC: 0.23 MG/DL (ref 0.3–1.2)
BUN BLDV-MCNC: 18 MG/DL (ref 6–20)
BUN/CREAT BLD: 19 (ref 9–20)
CALCIUM SERPL-MCNC: 9.5 MG/DL (ref 8.6–10.4)
CHLORIDE BLD-SCNC: 105 MMOL/L (ref 98–107)
CHOLESTEROL/HDL RATIO: 5.2
CHOLESTEROL: 160 MG/DL
CO2: 28 MMOL/L (ref 20–31)
CREAT SERPL-MCNC: 0.93 MG/DL (ref 0.7–1.2)
GFR AFRICAN AMERICAN: >60 ML/MIN
GFR NON-AFRICAN AMERICAN: >60 ML/MIN
GFR SERPL CREATININE-BSD FRML MDRD: ABNORMAL ML/MIN/{1.73_M2}
GFR SERPL CREATININE-BSD FRML MDRD: ABNORMAL ML/MIN/{1.73_M2}
GLUCOSE BLD-MCNC: 105 MG/DL (ref 70–99)
HDLC SERPL-MCNC: 31 MG/DL
LDL CHOLESTEROL: 76 MG/DL (ref 0–130)
POTASSIUM SERPL-SCNC: 4.7 MMOL/L (ref 3.7–5.3)
SODIUM BLD-SCNC: 143 MMOL/L (ref 135–144)
TOTAL PROTEIN: 6.6 G/DL (ref 6.4–8.3)
TRIGL SERPL-MCNC: 266 MG/DL
VLDLC SERPL CALC-MCNC: ABNORMAL MG/DL (ref 1–30)

## 2019-07-05 PROCEDURE — 80053 COMPREHEN METABOLIC PANEL: CPT

## 2019-07-05 PROCEDURE — 36415 COLL VENOUS BLD VENIPUNCTURE: CPT

## 2019-07-05 PROCEDURE — 80061 LIPID PANEL: CPT

## 2021-06-09 ENCOUNTER — HOSPITAL ENCOUNTER (OUTPATIENT)
Age: 61
Discharge: HOME | End: 2021-06-09
Payer: COMMERCIAL

## 2021-06-09 LAB
ALBUMIN SERPL-MCNC: 4.3 G/DL (ref 3.5–5.2)
ALBUMIN/GLOBULIN RATIO: 1.5 (ref 1–2.5)
ALP BLD-CCNC: 86 U/L (ref 40–129)
ALT SERPL-CCNC: 22 U/L (ref 5–41)
ANION GAP SERPL CALCULATED.3IONS-SCNC: 9 MMOL/L (ref 9–17)
AST SERPL-CCNC: 17 U/L
BILIRUB SERPL-MCNC: 0.29 MG/DL (ref 0.3–1.2)
BUN BLDV-MCNC: 22 MG/DL (ref 8–23)
BUN/CREAT BLD: 24 (ref 9–20)
CALCIUM SERPL-MCNC: 9.6 MG/DL (ref 8.6–10.4)
CHLORIDE BLD-SCNC: 107 MMOL/L (ref 98–107)
CHOLESTEROL/HDL RATIO: 5.9
CHOLESTEROL: 189 MG/DL
CO2: 26 MMOL/L (ref 20–31)
CREAT SERPL-MCNC: 0.92 MG/DL (ref 0.7–1.2)
GFR AFRICAN AMERICAN: >60 ML/MIN
GFR NON-AFRICAN AMERICAN: >60 ML/MIN
GFR SERPL CREATININE-BSD FRML MDRD: ABNORMAL ML/MIN/{1.73_M2}
GFR SERPL CREATININE-BSD FRML MDRD: ABNORMAL ML/MIN/{1.73_M2}
GLUCOSE BLD-MCNC: 101 MG/DL (ref 70–99)
HDLC SERPL-MCNC: 32 MG/DL
LDL CHOLESTEROL: 79 MG/DL (ref 0–130)
POTASSIUM SERPL-SCNC: 4.7 MMOL/L (ref 3.7–5.3)
SODIUM BLD-SCNC: 142 MMOL/L (ref 135–144)
TOTAL PROTEIN: 7.1 G/DL (ref 6.4–8.3)
TRIGL SERPL-MCNC: 390 MG/DL
VLDLC SERPL CALC-MCNC: ABNORMAL MG/DL (ref 1–30)

## 2021-06-09 PROCEDURE — 36415 COLL VENOUS BLD VENIPUNCTURE: CPT

## 2021-06-09 PROCEDURE — 80053 COMPREHEN METABOLIC PANEL: CPT

## 2021-06-09 PROCEDURE — 80061 LIPID PANEL: CPT

## 2021-12-08 ENCOUNTER — HOSPITAL ENCOUNTER (OUTPATIENT)
Age: 61
Discharge: HOME | End: 2021-12-08
Payer: COMMERCIAL

## 2021-12-08 LAB
ALBUMIN SERPL-MCNC: 4.3 G/DL (ref 3.5–5.2)
ALBUMIN/GLOBULIN RATIO: 1.8 (ref 1–2.5)
ALP BLD-CCNC: 99 U/L (ref 40–129)
ALT SERPL-CCNC: 21 U/L (ref 5–41)
ANION GAP SERPL CALCULATED.3IONS-SCNC: 12 MMOL/L (ref 9–17)
AST SERPL-CCNC: 21 U/L
BILIRUB SERPL-MCNC: 0.26 MG/DL (ref 0.3–1.2)
BUN BLDV-MCNC: 31 MG/DL (ref 8–23)
BUN/CREAT BLD: 34 (ref 9–20)
CALCIUM SERPL-MCNC: 9.5 MG/DL (ref 8.6–10.4)
CHLORIDE BLD-SCNC: 105 MMOL/L (ref 98–107)
CHOLESTEROL/HDL RATIO: 4.8
CHOLESTEROL: 164 MG/DL
CO2: 23 MMOL/L (ref 20–31)
CREAT SERPL-MCNC: 0.92 MG/DL (ref 0.7–1.2)
GFR AFRICAN AMERICAN: >60 ML/MIN
GFR NON-AFRICAN AMERICAN: >60 ML/MIN
GFR SERPL CREATININE-BSD FRML MDRD: ABNORMAL ML/MIN/{1.73_M2}
GFR SERPL CREATININE-BSD FRML MDRD: ABNORMAL ML/MIN/{1.73_M2}
GLUCOSE BLD-MCNC: 92 MG/DL (ref 70–99)
HDLC SERPL-MCNC: 34 MG/DL
LDL CHOLESTEROL: 82 MG/DL (ref 0–130)
POTASSIUM SERPL-SCNC: 4.1 MMOL/L (ref 3.7–5.3)
PROSTATE SPECIFIC ANTIGEN: 10.8 UG/L
SODIUM BLD-SCNC: 140 MMOL/L (ref 135–144)
TOTAL PROTEIN: 6.7 G/DL (ref 6.4–8.3)
TRIGL SERPL-MCNC: 238 MG/DL
VLDLC SERPL CALC-MCNC: ABNORMAL MG/DL (ref 1–30)

## 2021-12-08 PROCEDURE — 36415 COLL VENOUS BLD VENIPUNCTURE: CPT

## 2021-12-08 PROCEDURE — 80053 COMPREHEN METABOLIC PANEL: CPT

## 2021-12-08 PROCEDURE — G0103 PSA SCREENING: HCPCS

## 2021-12-08 PROCEDURE — 80061 LIPID PANEL: CPT

## 2022-01-05 ENCOUNTER — HOSPITAL ENCOUNTER (OUTPATIENT)
Age: 62
Discharge: HOME | End: 2022-01-05
Payer: COMMERCIAL

## 2022-01-05 PROCEDURE — 36415 COLL VENOUS BLD VENIPUNCTURE: CPT

## 2022-01-05 PROCEDURE — G0103 PSA SCREENING: HCPCS

## 2022-01-06 LAB — PROSTATE SPECIFIC ANTIGEN: 11.61 UG/L

## 2022-01-19 RX ORDER — OMEGA-3 FATTY ACIDS/FISH OIL 300-1000MG
CAPSULE ORAL DAILY
COMMUNITY

## 2022-01-19 RX ORDER — ALBUTEROL SULFATE 90 UG/1
2 AEROSOL, METERED RESPIRATORY (INHALATION) EVERY 6 HOURS PRN
COMMUNITY

## 2022-01-19 RX ORDER — ALPRAZOLAM 0.25 MG/1
0.25 TABLET ORAL NIGHTLY PRN
COMMUNITY
End: 2022-05-10

## 2022-01-24 ENCOUNTER — OFFICE VISIT (OUTPATIENT)
Dept: UROLOGY | Age: 62
End: 2022-01-24
Payer: COMMERCIAL

## 2022-01-24 ENCOUNTER — HOSPITAL ENCOUNTER (OUTPATIENT)
Age: 62
Setting detail: SPECIMEN
Discharge: HOME | End: 2022-01-24
Payer: COMMERCIAL

## 2022-01-24 VITALS
SYSTOLIC BLOOD PRESSURE: 164 MMHG | HEART RATE: 71 BPM | DIASTOLIC BLOOD PRESSURE: 92 MMHG | WEIGHT: 238 LBS | BODY MASS INDEX: 32.28 KG/M2

## 2022-01-24 DIAGNOSIS — R97.20 ELEVATED PSA: Primary | ICD-10-CM

## 2022-01-24 DIAGNOSIS — R97.20 ELEVATED PSA: ICD-10-CM

## 2022-01-24 LAB
-: ABNORMAL
AMORPHOUS: ABNORMAL
BACTERIA: ABNORMAL
BILIRUBIN URINE: NEGATIVE
CASTS UA: ABNORMAL /LPF
COLOR: YELLOW
COMMENT UA: ABNORMAL
CRYSTALS, UA: ABNORMAL /HPF
EPITHELIAL CELLS UA: ABNORMAL /HPF (ref 0–5)
GLUCOSE URINE: NEGATIVE
KETONES, URINE: NEGATIVE
LEUKOCYTE ESTERASE, URINE: NEGATIVE
MUCUS: ABNORMAL
NITRITE, URINE: NEGATIVE
OTHER OBSERVATIONS UA: ABNORMAL
PH UA: 6 (ref 5–9)
PROTEIN UA: NEGATIVE
RBC UA: ABNORMAL /HPF (ref 0–2)
RENAL EPITHELIAL, UA: ABNORMAL /HPF
SPECIFIC GRAVITY UA: >1.03 (ref 1.01–1.02)
TRICHOMONAS: ABNORMAL
TURBIDITY: CLEAR
URINE HGB: ABNORMAL
UROBILINOGEN, URINE: NORMAL
WBC UA: ABNORMAL /HPF (ref 0–5)
YEAST: ABNORMAL

## 2022-01-24 PROCEDURE — 81001 URINALYSIS AUTO W/SCOPE: CPT

## 2022-01-24 PROCEDURE — 99204 OFFICE O/P NEW MOD 45 MIN: CPT | Performed by: NURSE PRACTITIONER

## 2022-01-24 PROCEDURE — 87086 URINE CULTURE/COLONY COUNT: CPT

## 2022-01-24 RX ORDER — DOXYCYCLINE HYCLATE 100 MG
100 TABLET ORAL 2 TIMES DAILY
Qty: 42 TABLET | Refills: 0 | Status: SHIPPED | OUTPATIENT
Start: 2022-01-24 | End: 2022-02-14

## 2022-01-24 ASSESSMENT — ENCOUNTER SYMPTOMS
EYE REDNESS: 0
COUGH: 0
VOMITING: 0
SHORTNESS OF BREATH: 0
NAUSEA: 0
CONSTIPATION: 0
BACK PAIN: 0
WHEEZING: 0
COLOR CHANGE: 0
ABDOMINAL PAIN: 0

## 2022-01-24 NOTE — PROGRESS NOTES
HPI:          Patient is a 64 y.o. male in no acute distress. He is alert and oriented to person, place, and time. 3/2018 Referral from Dr. Britt Bradford for elevated PSA. No family history of prostate cancer, breast cancer or ovarian cancer    4/2018 Prostate biopsy for PSA of 5.54. Pathology: Benign prostate tissue in all 12 cores    PSA  1/2022 - 11.6  12/2021 - 10.8  2/2018 - 5.54  1/2018 - 5.25  4/2017 - 4.14    Today  Patient has been referred back to our office for an elevated PSA. Most recent PSA is 11.6. This is significantly elevated compared to his prior PSA of 5.54 when he had a negative prostate biopsy. This is after a course of cipro x10 days. He denies unintentional weight loss, decreased energy or appetite, new or worsening bone/hip/back pain. He denies any lower extremity numbness and tingling. He denies new or worsening LUTS. He denies gross hematuria or dysuria. Past Medical History:   Diagnosis Date    Autonomic nervous system disorder     Frostbite     History of femoral angiogram      Past Surgical History:   Procedure Laterality Date    FEMORAL BYPASS  11/29/2016    FOOT AMPUTATION THROUGH METATARSAL      times two    SYMPATHECTOMY       Outpatient Encounter Medications as of 1/24/2022   Medication Sig Dispense Refill    doxycycline hyclate (VIBRA-TABS) 100 MG tablet Take 1 tablet by mouth 2 times daily for 21 days 42 tablet 0    Omega 3 1000 MG CAPS Take by mouth      Misc Natural Products (OSTEO BI-FLEX ADV DOUBLE ST PO) Take by mouth      albuterol sulfate HFA (VENTOLIN HFA) 108 (90 Base) MCG/ACT inhaler Inhale 2 puffs into the lungs every 6 hours as needed for Wheezing      atorvastatin (LIPITOR) 20 MG tablet Take 20 mg by mouth daily      ibuprofen (ADVIL;MOTRIN) 800 MG tablet Take 800 mg by mouth daily       ALPRAZolam (XANAX) 0.25 MG tablet Take 0.25 mg by mouth nightly as needed for Sleep.  (Patient not taking: Reported on 1/24/2022)       No facility-administered encounter medications on file as of 2022. Current Outpatient Medications on File Prior to Visit   Medication Sig Dispense Refill    Omega 3 1000 MG CAPS Take by mouth      Misc Natural Products (OSTEO BI-FLEX ADV DOUBLE ST PO) Take by mouth      albuterol sulfate HFA (VENTOLIN HFA) 108 (90 Base) MCG/ACT inhaler Inhale 2 puffs into the lungs every 6 hours as needed for Wheezing      atorvastatin (LIPITOR) 20 MG tablet Take 20 mg by mouth daily      ibuprofen (ADVIL;MOTRIN) 800 MG tablet Take 800 mg by mouth daily       ALPRAZolam (XANAX) 0.25 MG tablet Take 0.25 mg by mouth nightly as needed for Sleep. (Patient not taking: Reported on 2022)       No current facility-administered medications on file prior to visit. Patient has no known allergies. Family History   Problem Relation Age of Onset    Cancer Mother      Social History     Tobacco Use   Smoking Status Former Smoker    Packs/day: 0.50    Quit date: 2016    Years since quittin.1   Smokeless Tobacco Never Used       Social History     Substance and Sexual Activity   Alcohol Use No       Review of Systems   Constitutional: Negative for appetite change, chills and fever. Eyes: Negative for redness and visual disturbance. Respiratory: Negative for cough, shortness of breath and wheezing. Cardiovascular: Negative for chest pain and leg swelling. Gastrointestinal: Negative for abdominal pain, constipation, nausea and vomiting. Genitourinary: Negative for decreased urine volume, difficulty urinating, dysuria, enuresis, flank pain, frequency, hematuria, penile discharge, penile pain, scrotal swelling, testicular pain and urgency. Musculoskeletal: Negative for back pain, joint swelling and myalgias. Skin: Negative for color change, rash and wound. Neurological: Negative for dizziness, tremors and numbness. Hematological: Negative for adenopathy. Does not bruise/bleed easily.        BP (!) 164/92 (Site: Right Upper Arm, Position: Sitting, Cuff Size: Large Adult)   Pulse 71   Wt 238 lb (108 kg)   BMI 32.28 kg/m²       PHYSICAL EXAM:  Constitutional: Patient in no acute distress; Neuro: alert and oriented to person place and time. Psych: Mood and affect normal.  Skin: Normal  Lungs: Respiratory effort normal  Cardiovascular:  Normal peripheral pulses  Abdomen: Soft, non-tender, non-distended with no CVA, flank pain  Bladder non-tender and not distended. Lab Results   Component Value Date    BUN 31 (H) 12/08/2021     Lab Results   Component Value Date    CREATININE 0.92 12/08/2021     Lab Results   Component Value Date    PSA 11.61 (H) 01/05/2022    PSA 10.80 (H) 12/08/2021    PSA 5.54 (H) 02/19/2018       ASSESSMENT:   Diagnosis Orders   1. Elevated PSA  VT MEASUREMENT,POST-VOID RESIDUAL VOLUME BY US,NON-IMAGING    MRI PROSTATE W WO CONTRAST    Urinalysis with Microscopic    Culture, Urine    PSA, Diagnostic         PLAN:  We will check a UACS    Since he had a prior negative biopsy, we will proceed with a prostate MRI.  He will follow-up to discuss results    We will also have him take 3 weeks of doxycycline and repeat the PSA in 6 weeks

## 2022-01-25 LAB
CULTURE: NO GROWTH
Lab: NORMAL
SPECIMEN DESCRIPTION: NORMAL

## 2022-01-26 ENCOUNTER — TELEPHONE (OUTPATIENT)
Dept: UROLOGY | Age: 62
End: 2022-01-26

## 2022-01-26 NOTE — RESULT ENCOUNTER NOTE
Call pt - urine cx reviewed and negative for UTI       [please add discuss hematuria to schedule for next visit]

## 2022-01-26 NOTE — TELEPHONE ENCOUNTER
----- Message from LEA Simmons - CNP sent at 1/26/2022  9:46 AM EST -----  Call pt - urine cx reviewed and negative for UTI       [please add discuss hematuria to schedule for next visit]

## 2022-01-27 ENCOUNTER — TELEPHONE (OUTPATIENT)
Dept: UROLOGY | Age: 62
End: 2022-01-27

## 2022-01-27 DIAGNOSIS — R97.20 ELEVATED PSA: Primary | ICD-10-CM

## 2022-01-28 ENCOUNTER — HOSPITAL ENCOUNTER (OUTPATIENT)
Age: 62
Discharge: HOME | End: 2022-01-28
Payer: COMMERCIAL

## 2022-01-28 ENCOUNTER — HOSPITAL ENCOUNTER (OUTPATIENT)
Age: 62
Discharge: HOME | End: 2022-01-30
Payer: COMMERCIAL

## 2022-01-28 ENCOUNTER — HOSPITAL ENCOUNTER (OUTPATIENT)
Dept: MRI IMAGING | Age: 62
Discharge: HOME | End: 2022-01-30
Payer: COMMERCIAL

## 2022-01-28 ENCOUNTER — HOSPITAL ENCOUNTER (OUTPATIENT)
Dept: GENERAL RADIOLOGY | Age: 62
Discharge: HOME | End: 2022-01-30
Payer: COMMERCIAL

## 2022-01-28 DIAGNOSIS — Z13.89 ENCOUNTER FOR IMAGING TO SCREEN FOR METAL PRIOR TO MAGNETIC RESONANCE IMAGING (MRI): ICD-10-CM

## 2022-01-28 DIAGNOSIS — R97.20 ELEVATED PSA: ICD-10-CM

## 2022-01-28 LAB
BUN BLDV-MCNC: 25 MG/DL (ref 8–23)
CREAT SERPL-MCNC: 0.93 MG/DL (ref 0.7–1.2)
GFR AFRICAN AMERICAN: >60 ML/MIN
GFR NON-AFRICAN AMERICAN: >60 ML/MIN
GFR SERPL CREATININE-BSD FRML MDRD: ABNORMAL ML/MIN/{1.73_M2}
GFR SERPL CREATININE-BSD FRML MDRD: ABNORMAL ML/MIN/{1.73_M2}

## 2022-01-28 PROCEDURE — 70030 X-RAY EYE FOR FOREIGN BODY: CPT

## 2022-01-28 PROCEDURE — 72197 MRI PELVIS W/O & W/DYE: CPT

## 2022-01-28 PROCEDURE — 36415 COLL VENOUS BLD VENIPUNCTURE: CPT

## 2022-01-28 PROCEDURE — 6360000004 HC RX CONTRAST MEDICATION: Performed by: NURSE PRACTITIONER

## 2022-01-28 PROCEDURE — 82565 ASSAY OF CREATININE: CPT

## 2022-01-28 PROCEDURE — A9579 GAD-BASE MR CONTRAST NOS,1ML: HCPCS | Performed by: NURSE PRACTITIONER

## 2022-01-28 PROCEDURE — 84520 ASSAY OF UREA NITROGEN: CPT

## 2022-01-28 RX ADMIN — GADOTERIDOL 20 ML: 279.3 INJECTION, SOLUTION INTRAVENOUS at 11:32

## 2022-02-08 ENCOUNTER — OFFICE VISIT (OUTPATIENT)
Dept: UROLOGY | Age: 62
End: 2022-02-08
Payer: COMMERCIAL

## 2022-02-08 VITALS
SYSTOLIC BLOOD PRESSURE: 140 MMHG | HEIGHT: 72 IN | BODY MASS INDEX: 32.1 KG/M2 | DIASTOLIC BLOOD PRESSURE: 82 MMHG | WEIGHT: 237 LBS | TEMPERATURE: 98.5 F

## 2022-02-08 DIAGNOSIS — R97.20 ELEVATED PSA: Primary | ICD-10-CM

## 2022-02-08 PROCEDURE — 99213 OFFICE O/P EST LOW 20 MIN: CPT | Performed by: UROLOGY

## 2022-02-08 ASSESSMENT — ENCOUNTER SYMPTOMS
ABDOMINAL PAIN: 0
WHEEZING: 0
BACK PAIN: 0
VOMITING: 0
COLOR CHANGE: 0
EYE REDNESS: 0
COUGH: 0
EYE PAIN: 0
SHORTNESS OF BREATH: 0
NAUSEA: 0

## 2022-02-08 NOTE — PROGRESS NOTES
HPI:          Patient is a 64 y.o. male in no acute distress. He is alert and oriented to person, place, and time.            3/2018 Referral from Dr. Michel Bradshaw for elevated PSA. No family history of prostate cancer, breast cancer or ovarian cancer     4/2018 Prostate biopsy for PSA of 5.54. Pathology: Benign prostate tissue in all 12 cores     PSA  1/2022 - 11.6  12/2021 - 10.8  2/2018 - 5.54  1/2018 - 5.25  4/2017 - 4.14      Currently  Patient is here today to go over recent MRI. Patient did have a recent elevated PSA. This is significantly elevated despite even having a 10-day course of ciprofloxacin. Patient did get a recent prostate MRI. This film was independently reviewed today. This does show a 7 mm PI-RADS 3 lesion in the right posterior medial aspect of the peripheral zone. Past Medical History:   Diagnosis Date    Autonomic nervous system disorder     Frostbite     History of femoral angiogram      Past Surgical History:   Procedure Laterality Date    FEMORAL BYPASS  11/29/2016    FOOT AMPUTATION THROUGH METATARSAL      times two    SYMPATHECTOMY       Outpatient Encounter Medications as of 2/8/2022   Medication Sig Dispense Refill    doxycycline hyclate (VIBRA-TABS) 100 MG tablet Take 1 tablet by mouth 2 times daily for 21 days 42 tablet 0    Omega 3 1000 MG CAPS Take by mouth      Misc Natural Products (OSTEO BI-FLEX ADV DOUBLE ST PO) Take by mouth      albuterol sulfate HFA (VENTOLIN HFA) 108 (90 Base) MCG/ACT inhaler Inhale 2 puffs into the lungs every 6 hours as needed for Wheezing      atorvastatin (LIPITOR) 20 MG tablet Take 20 mg by mouth daily      ibuprofen (ADVIL;MOTRIN) 800 MG tablet Take 800 mg by mouth daily       ALPRAZolam (XANAX) 0.25 MG tablet Take 0.25 mg by mouth nightly as needed for Sleep. (Patient not taking: Reported on 2/8/2022)       No facility-administered encounter medications on file as of 2/8/2022.       Current Outpatient Medications on File Prior to Visit   Medication Sig Dispense Refill    doxycycline hyclate (VIBRA-TABS) 100 MG tablet Take 1 tablet by mouth 2 times daily for 21 days 42 tablet 0    Omega 3 1000 MG CAPS Take by mouth      Misc Natural Products (OSTEO BI-FLEX ADV DOUBLE ST PO) Take by mouth      albuterol sulfate HFA (VENTOLIN HFA) 108 (90 Base) MCG/ACT inhaler Inhale 2 puffs into the lungs every 6 hours as needed for Wheezing      atorvastatin (LIPITOR) 20 MG tablet Take 20 mg by mouth daily      ibuprofen (ADVIL;MOTRIN) 800 MG tablet Take 800 mg by mouth daily       ALPRAZolam (XANAX) 0.25 MG tablet Take 0.25 mg by mouth nightly as needed for Sleep. (Patient not taking: Reported on 2022)       No current facility-administered medications on file prior to visit. Patient has no known allergies. Family History   Problem Relation Age of Onset    Cancer Mother      Social History     Tobacco Use   Smoking Status Former Smoker    Packs/day: 0.50    Quit date: 2016    Years since quittin.1   Smokeless Tobacco Never Used       Social History     Substance and Sexual Activity   Alcohol Use No       Review of Systems   Constitutional: Negative for appetite change, chills and fever. Eyes: Negative for pain, redness and visual disturbance. Respiratory: Negative for cough, shortness of breath and wheezing. Cardiovascular: Negative for chest pain and leg swelling. Gastrointestinal: Negative for abdominal pain, nausea and vomiting. Genitourinary: Negative for difficulty urinating, dysuria, flank pain, frequency, hematuria, penile discharge, scrotal swelling and testicular pain. Musculoskeletal: Negative for back pain, joint swelling and myalgias. Skin: Negative for color change, rash and wound. Neurological: Negative for dizziness, tremors and numbness. Hematological: Negative for adenopathy. Does not bruise/bleed easily.        BP (!) 140/82   Temp 98.5 °F (36.9 °C) (Infrared)   Ht 6' (1.829 m)   Wt 237 lb (107.5 kg)   BMI 32.14 kg/m²       PHYSICAL EXAM:  Constitutional: Patient in no acute distress; Neuro: alert and oriented to person place and time. Psych: Mood and affect normal.  Skin: Normal  Lungs: Respiratory effort normal  Cardiovascular:  Normal peripheral pulses  Abdomen: Soft, non-tender, non-distended with no CVA, flank pain  Bladder non-tender and not distended. Lymphatics: no palpable lymphadenopathy  Penis normal  Urethral meatus normal  Scrotal exam normal  Testicles normal bilaterally  Epididymis normal bilaterally  No evidence of inguinal hernia         Lab Results   Component Value Date    BUN 25 (H) 01/28/2022     Lab Results   Component Value Date    CREATININE 0.93 01/28/2022     Lab Results   Component Value Date    PSA 11.61 (H) 01/05/2022    PSA 10.80 (H) 12/08/2021    PSA 5.54 (H) 02/19/2018       ASSESSMENT:  This is a 64 y.o. male with the following diagnoses:   Diagnosis Orders   1. Elevated PSA  PSA, Diagnostic       PLAN:  I did offer patient a biopsy today. He did wish to wait. He will plan for repeat PSA in 3 months. I did tell him if his PSA goes up any further then he will need to have a biopsy.

## 2022-02-08 NOTE — PATIENT INSTRUCTIONS
SURVEY:    You may be receiving a survey from [employer] regarding your visit today. Please complete the survey to enable us to provide the highest quality of care to you and your family. If you cannot score us a very good on any question, please call the office to discuss how we could have made your experience a very good one. Thank you.

## 2022-02-15 ENCOUNTER — TELEPHONE (OUTPATIENT)
Dept: UROLOGY | Age: 62
End: 2022-02-15

## 2022-02-15 DIAGNOSIS — I71.43 ANEURYSM OF INFRARENAL ABDOMINAL AORTA: Primary | ICD-10-CM

## 2022-02-15 NOTE — TELEPHONE ENCOUNTER
Attempted to call patient regarding incidental finding of aortic aneurysm. Unable to leave a voicemail. Will attempt to call again later.

## 2022-02-22 DIAGNOSIS — I71.43 ANEURYSM OF INFRARENAL ABDOMINAL AORTA: Primary | ICD-10-CM

## 2022-02-22 NOTE — TELEPHONE ENCOUNTER
Discussed with patient the concerning findings for an infrarenal aortic aneurysm. He did agree to proceed with a CTA. Please send him to central scheduling to get this scheduled. We will call him with results when available.

## 2022-03-01 ENCOUNTER — TELEPHONE (OUTPATIENT)
Dept: UROLOGY | Age: 62
End: 2022-03-01

## 2022-03-01 DIAGNOSIS — I71.43 ANEURYSM OF INFRARENAL ABDOMINAL AORTA: Primary | ICD-10-CM

## 2022-03-01 NOTE — TELEPHONE ENCOUNTER
Patrick Del Rio from radiology called - patient scheduled for CT tomorrow and needs BUN/Creatinine prior.   Need order

## 2022-03-02 ENCOUNTER — HOSPITAL ENCOUNTER (OUTPATIENT)
Age: 62
Discharge: HOME | End: 2022-03-02
Payer: COMMERCIAL

## 2022-03-02 ENCOUNTER — HOSPITAL ENCOUNTER (OUTPATIENT)
Dept: CT IMAGING | Age: 62
Discharge: HOME | End: 2022-03-04
Payer: COMMERCIAL

## 2022-03-02 DIAGNOSIS — I71.43 ANEURYSM OF INFRARENAL ABDOMINAL AORTA: ICD-10-CM

## 2022-03-02 LAB
BUN BLDV-MCNC: 24 MG/DL (ref 8–23)
CREAT SERPL-MCNC: 0.91 MG/DL (ref 0.7–1.2)
GFR AFRICAN AMERICAN: >60 ML/MIN
GFR NON-AFRICAN AMERICAN: >60 ML/MIN
GFR SERPL CREATININE-BSD FRML MDRD: ABNORMAL ML/MIN/{1.73_M2}
GFR SERPL CREATININE-BSD FRML MDRD: ABNORMAL ML/MIN/{1.73_M2}

## 2022-03-02 PROCEDURE — 6360000004 HC RX CONTRAST MEDICATION: Performed by: PHYSICIAN ASSISTANT

## 2022-03-02 PROCEDURE — 84520 ASSAY OF UREA NITROGEN: CPT

## 2022-03-02 PROCEDURE — 36415 COLL VENOUS BLD VENIPUNCTURE: CPT

## 2022-03-02 PROCEDURE — 82565 ASSAY OF CREATININE: CPT

## 2022-03-02 PROCEDURE — 74174 CTA ABD&PLVS W/CONTRAST: CPT

## 2022-03-02 RX ADMIN — IOPAMIDOL 75 ML: 755 INJECTION, SOLUTION INTRAVENOUS at 13:45

## 2022-03-03 ENCOUNTER — TELEPHONE (OUTPATIENT)
Dept: UROLOGY | Age: 62
End: 2022-03-03

## 2022-03-03 NOTE — TELEPHONE ENCOUNTER
----- Message from LEA Kinsey CNP sent at 3/3/2022  1:41 PM EST -----  CTA shows a 3.8 cm infrarenal abdominal aortic aneurysm. He will need a CT scan in 2 years to evaluate this. We are sending these results to his primary care provider and his PCP can follow-up on this as indicated.     [Please ensure Dr. Lyn Mares also sees the finding of the lung nodule as he will have to determine if this patient is high risk or not and if he needs a chest CT]

## 2022-03-04 ENCOUNTER — TELEPHONE (OUTPATIENT)
Dept: UROLOGY | Age: 62
End: 2022-03-04

## 2022-03-04 NOTE — TELEPHONE ENCOUNTER
----- Message from LEA Dang CNP sent at 3/3/2022  1:41 PM EST -----  CTA shows a 3.8 cm infrarenal abdominal aortic aneurysm. He will need a CT scan in 2 years to evaluate this. We are sending these results to his primary care provider and his PCP can follow-up on this as indicated.     [Please ensure Dr. Aquiles Gutierrez also sees the finding of the lung nodule as he will have to determine if this patient is high risk or not and if he needs a chest CT]

## 2022-03-09 ENCOUNTER — TELEPHONE (OUTPATIENT)
Dept: UROLOGY | Age: 62
End: 2022-03-09

## 2022-03-09 NOTE — TELEPHONE ENCOUNTER
Received request from [employer] for records/result of patient's diagnostic testing.   Faxed the results and received confirmation fax

## 2022-03-09 NOTE — TELEPHONE ENCOUNTER
Called Dr. Lamberto English office to confirm the office received the patient's records/Jessa confirmed.

## 2022-05-06 ENCOUNTER — HOSPITAL ENCOUNTER (OUTPATIENT)
Age: 62
Discharge: HOME | End: 2022-05-06
Payer: COMMERCIAL

## 2022-05-06 DIAGNOSIS — R97.20 ELEVATED PSA: ICD-10-CM

## 2022-05-06 LAB — PROSTATE SPECIFIC ANTIGEN: 11.69 NG/ML

## 2022-05-06 PROCEDURE — 84153 ASSAY OF PSA TOTAL: CPT

## 2022-05-06 PROCEDURE — 36415 COLL VENOUS BLD VENIPUNCTURE: CPT

## 2022-05-10 ENCOUNTER — OFFICE VISIT (OUTPATIENT)
Dept: UROLOGY | Age: 62
End: 2022-05-10
Payer: COMMERCIAL

## 2022-05-10 VITALS
HEART RATE: 85 BPM | SYSTOLIC BLOOD PRESSURE: 134 MMHG | BODY MASS INDEX: 31.97 KG/M2 | TEMPERATURE: 98.2 F | WEIGHT: 236 LBS | DIASTOLIC BLOOD PRESSURE: 84 MMHG | HEIGHT: 72 IN

## 2022-05-10 DIAGNOSIS — R97.20 ELEVATED PSA: Primary | ICD-10-CM

## 2022-05-10 PROCEDURE — 99214 OFFICE O/P EST MOD 30 MIN: CPT | Performed by: NURSE PRACTITIONER

## 2022-05-10 RX ORDER — CIPROFLOXACIN 500 MG/1
500 TABLET, FILM COATED ORAL 2 TIMES DAILY
Qty: 6 TABLET | Refills: 0 | Status: SHIPPED | OUTPATIENT
Start: 2022-05-10 | End: 2022-05-13

## 2022-05-10 NOTE — PROGRESS NOTES
HPI:          Patient is a 64 y.o. male in no acute distress. He is alert and oriented to person, place, and time. 3/2018 Referral from Dr. Jj Morneo for elevated PSA. No family history of prostate cancer, breast cancer or ovarian cancer    2018 Prostate biopsy for PSA of 5.54. Pathology: Benign prostate tissue in all 12 cores    PSA  2022 - 11.69  2022 - 11.6  2021 - 10.8  2018 - 5.54  2018 - 5.25  2017 - 4.14    2022 prostate MRI showed a PI-RADS 3 lesion in the right posterior medial peripheral zone    Today  Here today to follow-up for elevated PSA. Most recent PSA is 11.6. His PSA does continue to rise. He did have a prostate MRI in 2022 that showed showed a PI-RADS 3 lesion in the right posterior medial peripheral zone. He denies unintentional weight loss, decreased energy or appetite, new or worsening bone/hip/back pain. He denies any lower extremity numbness and tingling. He denies new or worsening LUTS. He denies gross hematuria or dysuria.        Past Medical History:   Diagnosis Date    Autonomic nervous system disorder     Frostbite     History of femoral angiogram      Past Surgical History:   Procedure Laterality Date    FEMORAL BYPASS  2016    FOOT AMPUTATION THROUGH METATARSAL      times two    SYMPATHECTOMY       Outpatient Encounter Medications as of 5/10/2022   Medication Sig Dispense Refill    [] ciprofloxacin (CIPRO) 500 MG tablet Take 1 tablet by mouth 2 times daily for 3 days 6 tablet 0    Omega 3 1000 MG CAPS Take by mouth      Misc Natural Products (OSTEO BI-FLEX ADV DOUBLE ST PO) Take by mouth      albuterol sulfate HFA (VENTOLIN HFA) 108 (90 Base) MCG/ACT inhaler Inhale 2 puffs into the lungs every 6 hours as needed for Wheezing      atorvastatin (LIPITOR) 20 MG tablet Take 20 mg by mouth daily      ibuprofen (ADVIL;MOTRIN) 800 MG tablet Take 800 mg by mouth daily       [DISCONTINUED] ALPRAZolam (XANAX) 0.25 MG tablet Take 0.25 mg by mouth nightly as needed for Sleep. (Patient not taking: Reported on 2022)       No facility-administered encounter medications on file as of 5/10/2022. Current Outpatient Medications on File Prior to Visit   Medication Sig Dispense Refill    Omega 3 1000 MG CAPS Take by mouth      Misc Natural Products (OSTEO BI-FLEX ADV DOUBLE ST PO) Take by mouth      albuterol sulfate HFA (VENTOLIN HFA) 108 (90 Base) MCG/ACT inhaler Inhale 2 puffs into the lungs every 6 hours as needed for Wheezing      atorvastatin (LIPITOR) 20 MG tablet Take 20 mg by mouth daily      ibuprofen (ADVIL;MOTRIN) 800 MG tablet Take 800 mg by mouth daily        No current facility-administered medications on file prior to visit. Patient has no known allergies. Family History   Problem Relation Age of Onset    Cancer Mother      Social History     Tobacco Use   Smoking Status Former Smoker    Packs/day: 0.50    Quit date: 2016    Years since quittin.4   Smokeless Tobacco Never Used       Social History     Substance and Sexual Activity   Alcohol Use No       Review of Systems   Constitutional: Negative for appetite change, chills and fever. Eyes: Negative for redness and visual disturbance. Respiratory: Negative for cough, shortness of breath and wheezing. Cardiovascular: Negative for chest pain and leg swelling. Gastrointestinal: Negative for abdominal pain, constipation, nausea and vomiting. Genitourinary: Negative for decreased urine volume, difficulty urinating, dysuria, enuresis, flank pain, frequency, hematuria, penile discharge, penile pain, scrotal swelling, testicular pain and urgency. Musculoskeletal: Negative for back pain, joint swelling and myalgias. Skin: Negative for color change, rash and wound. Neurological: Negative for dizziness, tremors and numbness. Hematological: Negative for adenopathy. Does not bruise/bleed easily.        /84 (Site: Right Upper Arm, Position: Sitting, Cuff Size: Medium Adult)   Pulse 85   Temp 98.2 °F (36.8 °C)   Ht 6' (1.829 m)   Wt 236 lb (107 kg)   BMI 32.01 kg/m²       PHYSICAL EXAM:  Constitutional: Patient in no acute distress; Neuro: alert and oriented to person place and time. Psych: Mood and affect normal.  Skin: Normal  Lungs: Respiratory effort normal  Cardiovascular:  Normal peripheral pulses  Abdomen: Soft, non-tender, non-distended with no CVA, flank pain  Bladder non-tender and not distended. Lab Results   Component Value Date    BUN 24 (H) 03/02/2022     Lab Results   Component Value Date    CREATININE 0.91 03/02/2022     Lab Results   Component Value Date    PSA 11.69 (H) 05/06/2022    PSA 11.61 (H) 01/05/2022    PSA 10.80 (H) 12/08/2021       ASSESSMENT:   Diagnosis Orders   1. Elevated PSA           PLAN:  We discussed that due to his elevated PSA and PI-RADS 3 lesion on MRI we recommend a targeted prostate biopsy. The patient was told this was typically done with ultrasound guidance and a prostate block with local anesthesia to minimize the discomfort. I discussed the risks including infection, bleeding, hematospermia and rarely sepsis. He was encouraged to give himself a Fleets enema the night prior to the biopsy and to take his antibiotics as instructed. He was told to stop taking ASA and other blood thinners at least a week prior to the biopsy. He was told to go the ER if he experiences fever 100.1F or greater, chills or severe bleeding after the biopsy. Patient amenable to schedule biopsy.

## 2022-05-16 ASSESSMENT — ENCOUNTER SYMPTOMS
EYE REDNESS: 0
SHORTNESS OF BREATH: 0
WHEEZING: 0
NAUSEA: 0
ABDOMINAL PAIN: 0
COUGH: 0
COLOR CHANGE: 0
CONSTIPATION: 0
BACK PAIN: 0
VOMITING: 0

## 2022-05-23 ENCOUNTER — TELEPHONE (OUTPATIENT)
Dept: UROLOGY | Age: 62
End: 2022-05-23

## 2022-05-23 RX ORDER — CIPROFLOXACIN 500 MG/1
500 TABLET, FILM COATED ORAL 2 TIMES DAILY
Qty: 6 TABLET | Refills: 0 | Status: SHIPPED | OUTPATIENT
Start: 2022-05-23 | End: 2022-05-26

## 2022-05-31 ENCOUNTER — HOSPITAL ENCOUNTER (OUTPATIENT)
Age: 62
Setting detail: SPECIMEN
Discharge: HOME | End: 2022-05-31
Payer: COMMERCIAL

## 2022-05-31 ENCOUNTER — PROCEDURE VISIT (OUTPATIENT)
Dept: UROLOGY | Age: 62
End: 2022-05-31
Payer: COMMERCIAL

## 2022-05-31 VITALS
TEMPERATURE: 97.7 F | HEIGHT: 72 IN | SYSTOLIC BLOOD PRESSURE: 147 MMHG | DIASTOLIC BLOOD PRESSURE: 91 MMHG | HEART RATE: 76 BPM | WEIGHT: 234 LBS | BODY MASS INDEX: 31.69 KG/M2

## 2022-05-31 DIAGNOSIS — R97.20 ELEVATED PSA: Primary | ICD-10-CM

## 2022-05-31 DIAGNOSIS — R97.20 ELEVATED PSA: ICD-10-CM

## 2022-05-31 PROCEDURE — 88305 TISSUE EXAM BY PATHOLOGIST: CPT

## 2022-05-31 PROCEDURE — 88342 IMHCHEM/IMCYTCHM 1ST ANTB: CPT

## 2022-05-31 PROCEDURE — 88344 IMHCHEM/IMCYTCHM EA MLT ANTB: CPT

## 2022-05-31 PROCEDURE — 55700 PR BIOPSY OF PROSTATE,NEEDLE/PUNCH: CPT | Performed by: UROLOGY

## 2022-05-31 PROCEDURE — 76872 US TRANSRECTAL: CPT | Performed by: UROLOGY

## 2022-05-31 NOTE — PROGRESS NOTES
HPI:          Patient is a 64 y.o. male in no acute distress. He is alert and oriented to person, place, and time. History  3/2018 Referral from Dr. Viji Phillips for elevated PSA. No family history of prostate cancer, breast cancer or ovarian cancer     4/2018 Prostate biopsy for PSA of 5.54. Pathology: Benign prostate tissue in all 12 cores     PSA  5/2022 - 11.69  1/2022 - 11.6  12/2021 - 10.8  2/2018 - 5.54  1/2018 - 5.25  4/2017 - 4.14     1/2022 prostate MRI showed a PI-RADS 3 lesion in the right posterior medial peripheral zone. Currently  We discussed that due to his elevated PSA we recommend a prostate biopsy. The patient was told this was typically done with ultrasound guidance and a prostate block with local anesthesia to minimize the discomfort. I discussed the risks including infection, bleeding, hematospermia and rarely sepsis. He was encouraged to give himself a Fleets enema the night prior to the biopsy and to take his antibiotics as instructed. He was told to stop taking ASA and other blood thinners at least a week prior to the biopsy. He was told to go the ER if he experiences fever =101 or greater, chills or severe bleeding after the biopsy. Patient amenable to schedule biopsy. He did receive appropriate antibiotic therapy the day prior to biopsy, the day of the biopsy and will continue to take antibiotics after his biopsy. He did undergo 2 enemas prior. Patient was place in the left lateral decubitus position. Biplanar transrectal ultrasound probe was placed in patients rectum. Prostate visualized in both transverse and longitudinal views. Area of Neurovascular bundles visualized and bathed with 1% lidocaine, approximately 5ml per side. Prostate biopsy today shows volume of 51 grams. No abnormal calcifications or hypoechoic areas. 12 needle core biopsies taken, 6 from each side.  Lateral and Mid from each portion base, apex, and mid    No adverse events noted. He tolerated the procedure well with no issues. Past Medical History:   Diagnosis Date    Autonomic nervous system disorder     Frostbite     History of femoral angiogram      Past Surgical History:   Procedure Laterality Date    FEMORAL BYPASS  2016    FOOT AMPUTATION THROUGH METATARSAL      times two    SYMPATHECTOMY       Outpatient Encounter Medications as of 2022   Medication Sig Dispense Refill    Omega 3 1000 MG CAPS Take by mouth      Misc Natural Products (OSTEO BI-FLEX ADV DOUBLE ST PO) Take by mouth      albuterol sulfate HFA (VENTOLIN HFA) 108 (90 Base) MCG/ACT inhaler Inhale 2 puffs into the lungs every 6 hours as needed for Wheezing      atorvastatin (LIPITOR) 20 MG tablet Take 20 mg by mouth daily      ibuprofen (ADVIL;MOTRIN) 800 MG tablet Take 800 mg by mouth daily        No facility-administered encounter medications on file as of 2022. Current Outpatient Medications on File Prior to Visit   Medication Sig Dispense Refill    Omega 3 1000 MG CAPS Take by mouth      Misc Natural Products (OSTEO BI-FLEX ADV DOUBLE ST PO) Take by mouth      albuterol sulfate HFA (VENTOLIN HFA) 108 (90 Base) MCG/ACT inhaler Inhale 2 puffs into the lungs every 6 hours as needed for Wheezing      atorvastatin (LIPITOR) 20 MG tablet Take 20 mg by mouth daily      ibuprofen (ADVIL;MOTRIN) 800 MG tablet Take 800 mg by mouth daily        No current facility-administered medications on file prior to visit. Patient has no known allergies. Family History   Problem Relation Age of Onset    Cancer Mother      Social History     Tobacco Use   Smoking Status Former Smoker    Packs/day: 0.50    Quit date: 2016    Years since quittin.5   Smokeless Tobacco Never Used       Social History     Substance and Sexual Activity   Alcohol Use No       Review of Systems    There were no vitals taken for this visit.       PHYSICAL EXAM:  Constitutional: Patient in no acute distress; Neuro: alert and oriented to person place and time. Psych: Mood and affect normal.  Skin: Normal  Lungs: Respiratory effort normal  Cardiovascular:  Normal peripheral pulses  Abdomen: Soft, non-tender, non-distended with no CVA, flank pain  Bladder non-tender and not distended. Lymphatics: no palpable lymphadenopathy  Penis normal  Urethral meatus normal  Scrotal exam normal  Testicles normal bilaterally  Epididymis normal bilaterally  No evidence of inguinal hernia      Lab Results   Component Value Date    BUN 24 (H) 03/02/2022     Lab Results   Component Value Date    CREATININE 0.91 03/02/2022     Lab Results   Component Value Date    PSA 11.69 (H) 05/06/2022    PSA 11.61 (H) 01/05/2022    PSA 10.80 (H) 12/08/2021       ASSESSMENT:  This is a 64 y.o. male with the following diagnoses:   Diagnosis Orders   1. Elevated PSA         PLAN:  Targeted prostate biopsy today. F/u in one week for pathology review.

## 2022-05-31 NOTE — PATIENT INSTRUCTIONS
SURVEY:    You may be receiving a survey from [facility] regarding your visit today. Please complete the survey to enable us to provide the highest quality of care to you and your family. If you cannot score us a very good on any question, please call the office to discuss how we could have made your experience a very good one. Thank you.   Josy

## 2022-05-31 NOTE — PROGRESS NOTES
The following was used during the prostate biopsy without adverse affects:    [place] Yamilet  Lot number [id number]  Expiration date 2024-02-29      BIOPSY GUIDE  Lot number [id number]  Expiration date 2023-08-18      XYLOCAINE  MG/5ML INJECTABLE   Lot number [id number]  Expiration date 02/25  (used 2 vials)

## 2022-06-03 LAB — SURGICAL PATHOLOGY REPORT: NORMAL

## 2022-06-07 ENCOUNTER — OFFICE VISIT (OUTPATIENT)
Dept: UROLOGY | Age: 62
End: 2022-06-07
Payer: COMMERCIAL

## 2022-06-07 DIAGNOSIS — C61 PROSTATE CANCER (HCC): Primary | ICD-10-CM

## 2022-06-07 PROCEDURE — 99214 OFFICE O/P EST MOD 30 MIN: CPT | Performed by: NURSE PRACTITIONER

## 2022-09-06 ENCOUNTER — HOSPITAL ENCOUNTER (OUTPATIENT)
Age: 62
Discharge: HOME | End: 2022-09-06
Payer: COMMERCIAL

## 2022-09-06 DIAGNOSIS — C61 PROSTATE CANCER (HCC): ICD-10-CM

## 2022-09-06 PROCEDURE — 36415 COLL VENOUS BLD VENIPUNCTURE: CPT

## 2022-09-06 PROCEDURE — 84153 ASSAY OF PSA TOTAL: CPT

## 2022-09-07 LAB — PROSTATE SPECIFIC ANTIGEN: 12.35 NG/ML

## 2022-09-08 ENCOUNTER — OFFICE VISIT (OUTPATIENT)
Dept: UROLOGY | Age: 62
End: 2022-09-08
Payer: COMMERCIAL

## 2022-09-08 VITALS
BODY MASS INDEX: 31.56 KG/M2 | RESPIRATION RATE: 18 BRPM | HEIGHT: 72 IN | HEART RATE: 75 BPM | DIASTOLIC BLOOD PRESSURE: 89 MMHG | SYSTOLIC BLOOD PRESSURE: 144 MMHG | WEIGHT: 233 LBS | TEMPERATURE: 98 F

## 2022-09-08 DIAGNOSIS — C61 PROSTATE CANCER (HCC): Primary | ICD-10-CM

## 2022-09-08 PROCEDURE — 99213 OFFICE O/P EST LOW 20 MIN: CPT | Performed by: UROLOGY

## 2022-12-06 ENCOUNTER — HOSPITAL ENCOUNTER (OUTPATIENT)
Age: 62
Discharge: HOME | End: 2022-12-06
Payer: COMMERCIAL

## 2022-12-06 DIAGNOSIS — C61 PROSTATE CANCER (HCC): ICD-10-CM

## 2022-12-06 LAB
ALBUMIN SERPL-MCNC: 4.6 G/DL (ref 3.5–5.2)
ALBUMIN/GLOBULIN RATIO: 1.6 (ref 1–2.5)
ALP BLD-CCNC: 97 U/L (ref 40–129)
ALT SERPL-CCNC: 18 U/L (ref 5–41)
ANION GAP SERPL CALCULATED.3IONS-SCNC: 7 MMOL/L (ref 9–17)
AST SERPL-CCNC: 17 U/L
BILIRUB SERPL-MCNC: 0.4 MG/DL (ref 0.3–1.2)
BUN BLDV-MCNC: 20 MG/DL (ref 8–23)
BUN/CREAT BLD: 20 (ref 9–20)
CALCIUM SERPL-MCNC: 9.6 MG/DL (ref 8.6–10.4)
CHLORIDE BLD-SCNC: 103 MMOL/L (ref 98–107)
CHOLESTEROL/HDL RATIO: 5
CHOLESTEROL: 160 MG/DL
CO2: 28 MMOL/L (ref 20–31)
CREAT SERPL-MCNC: 1.02 MG/DL (ref 0.7–1.2)
GFR SERPL CREATININE-BSD FRML MDRD: >60 ML/MIN/1.73M2
GLUCOSE BLD-MCNC: 98 MG/DL (ref 70–99)
HDLC SERPL-MCNC: 32 MG/DL
LDL CHOLESTEROL: 98 MG/DL (ref 0–130)
POTASSIUM SERPL-SCNC: 4.8 MMOL/L (ref 3.7–5.3)
PROSTATE SPECIFIC ANTIGEN: 12.29 NG/ML
SODIUM BLD-SCNC: 138 MMOL/L (ref 135–144)
TOTAL PROTEIN: 7.4 G/DL (ref 6.4–8.3)
TRIGL SERPL-MCNC: 151 MG/DL

## 2022-12-06 PROCEDURE — 80053 COMPREHEN METABOLIC PANEL: CPT

## 2022-12-06 PROCEDURE — 36415 COLL VENOUS BLD VENIPUNCTURE: CPT

## 2022-12-06 PROCEDURE — 80061 LIPID PANEL: CPT

## 2022-12-06 PROCEDURE — 84153 ASSAY OF PSA TOTAL: CPT

## 2022-12-12 ENCOUNTER — OFFICE VISIT (OUTPATIENT)
Dept: UROLOGY | Age: 62
End: 2022-12-12
Payer: COMMERCIAL

## 2022-12-12 VITALS
SYSTOLIC BLOOD PRESSURE: 142 MMHG | BODY MASS INDEX: 32.78 KG/M2 | TEMPERATURE: 98 F | WEIGHT: 242 LBS | DIASTOLIC BLOOD PRESSURE: 83 MMHG | HEIGHT: 72 IN

## 2022-12-12 DIAGNOSIS — C61 PROSTATE CANCER (HCC): Primary | ICD-10-CM

## 2022-12-12 PROCEDURE — 99213 OFFICE O/P EST LOW 20 MIN: CPT | Performed by: NURSE PRACTITIONER

## 2022-12-12 ASSESSMENT — ENCOUNTER SYMPTOMS
VOMITING: 0
EYE REDNESS: 0
COUGH: 0
CONSTIPATION: 0
NAUSEA: 0
ABDOMINAL PAIN: 0
SHORTNESS OF BREATH: 0
COLOR CHANGE: 0
WHEEZING: 0
BACK PAIN: 0

## 2022-12-12 NOTE — PROGRESS NOTES
HPI:          Patient is a 58 y.o. male in no acute distress. He is alert and oriented to person, place, and time. History  3/2018 Referral from Dr. Omar Lin for elevated PSA. No family history of prostate cancer, breast cancer or ovarian cancer     4/2018 Prostate biopsy for PSA of 5.54. Pathology: Benign prostate tissue in all 12 cores     PSA  12/2022 - 12.29  8/2022 - 12.35  5/2022 - 11.69  1/2022 - 11.6  12/2021 - 10.8  2/2018 - 5.54  1/2018 - 5.25  4/2017 - 4.14       1/2022 prostate MRI showed a PI-RADS 3 lesion in the right posterior medial peripheral zone     5/2022 Prostate biopsy for PSA of 11.69. Pathology: LB 3+3=6, 8%. RB PIN. Grade group IIA, favorable intermediate risk group    Today  Here today to follow-up for prostate cancer. Most recent PSA is 12.29. He is on active surveillance. He denies unintentional weight loss, decreased energy or appetite, new or worsening bone/hip/back pain. He denies any lower extremity numbness and tingling. He denies new or worsening LUTS. He denies gross hematuria or dysuria. Past Medical History:   Diagnosis Date    Autonomic nervous system disorder     Frostbite     History of femoral angiogram      Past Surgical History:   Procedure Laterality Date    FEMORAL BYPASS  11/29/2016    FOOT AMPUTATION THROUGH METATARSAL      times two    SYMPATHECTOMY       Outpatient Encounter Medications as of 12/12/2022   Medication Sig Dispense Refill    Omega 3 1000 MG CAPS Take by mouth daily       Misc Natural Products (OSTEO BI-FLEX ADV DOUBLE ST PO) Take by mouth      albuterol sulfate HFA (PROVENTIL;VENTOLIN;PROAIR) 108 (90 Base) MCG/ACT inhaler Inhale 2 puffs into the lungs every 6 hours as needed for Wheezing      atorvastatin (LIPITOR) 20 MG tablet Take 20 mg by mouth daily      ibuprofen (ADVIL;MOTRIN) 800 MG tablet Take 800 mg by mouth in the morning and at bedtime       No facility-administered encounter medications on file as of 12/12/2022. Current Outpatient Medications on File Prior to Visit   Medication Sig Dispense Refill    Omega 3 1000 MG CAPS Take by mouth daily       Misc Natural Products (OSTEO BI-FLEX ADV DOUBLE ST PO) Take by mouth      albuterol sulfate HFA (PROVENTIL;VENTOLIN;PROAIR) 108 (90 Base) MCG/ACT inhaler Inhale 2 puffs into the lungs every 6 hours as needed for Wheezing      atorvastatin (LIPITOR) 20 MG tablet Take 20 mg by mouth daily      ibuprofen (ADVIL;MOTRIN) 800 MG tablet Take 800 mg by mouth in the morning and at bedtime       No current facility-administered medications on file prior to visit. Patient has no known allergies. Family History   Problem Relation Age of Onset    Cancer Mother      Social History     Tobacco Use   Smoking Status Former    Packs/day: 0.50    Types: Cigarettes    Quit date: 2016    Years since quittin.0   Smokeless Tobacco Never       Social History     Substance and Sexual Activity   Alcohol Use No       Review of Systems   Constitutional:  Negative for appetite change, chills and fever. Eyes:  Negative for redness and visual disturbance. Respiratory:  Negative for cough, shortness of breath and wheezing. Cardiovascular:  Negative for chest pain and leg swelling. Gastrointestinal:  Negative for abdominal pain, constipation, nausea and vomiting. Genitourinary:  Negative for decreased urine volume, difficulty urinating, dysuria, enuresis, flank pain, frequency, hematuria, penile discharge, penile pain, scrotal swelling, testicular pain and urgency. Musculoskeletal:  Negative for back pain, joint swelling and myalgias. Skin:  Negative for color change, rash and wound. Neurological:  Negative for dizziness, tremors and numbness. Hematological:  Negative for adenopathy. Does not bruise/bleed easily.      BP (!) 142/83 (Site: Right Upper Arm, Position: Sitting, Cuff Size: Large Adult)   Temp 98 °F (36.7 °C)   Ht 6' (1.829 m)   Wt 242 lb (109.8 kg)   BMI 32.82 kg/m²       PHYSICAL EXAM:  Constitutional: Patient in no acute distress; Neuro: alert and oriented to person place and time. Psych: Mood and affect normal.  Skin: Normal  Lungs: Respiratory effort normal  Cardiovascular:  Normal peripheral pulses  Abdomen: Soft, non-tender, non-distended with no CVA, flank pain  Bladder non-tender and not distended. Lab Results   Component Value Date    BUN 20 12/06/2022     Lab Results   Component Value Date    CREATININE 1.02 12/06/2022     Lab Results   Component Value Date    PSA 12.29 (H) 12/06/2022    PSA 12.35 (H) 09/06/2022    PSA 11.69 (H) 05/06/2022       ASSESSMENT:   Diagnosis Orders   1.  Prostate cancer (HCC)  PSA, Diagnostic            PLAN:  Continue active surveillance    F/U in 3 months with PSA prior

## 2023-03-14 ENCOUNTER — HOSPITAL ENCOUNTER (OUTPATIENT)
Age: 63
Discharge: HOME | End: 2023-03-14
Payer: COMMERCIAL

## 2023-03-14 DIAGNOSIS — C61 PROSTATE CANCER (HCC): ICD-10-CM

## 2023-03-14 LAB — PROSTATE SPECIFIC ANTIGEN: 9.31 NG/ML

## 2023-03-14 PROCEDURE — 84153 ASSAY OF PSA TOTAL: CPT

## 2023-03-14 PROCEDURE — 36415 COLL VENOUS BLD VENIPUNCTURE: CPT

## 2023-03-16 ENCOUNTER — OFFICE VISIT (OUTPATIENT)
Dept: UROLOGY | Age: 63
End: 2023-03-16
Payer: COMMERCIAL

## 2023-03-16 VITALS
HEIGHT: 72 IN | HEART RATE: 63 BPM | DIASTOLIC BLOOD PRESSURE: 81 MMHG | SYSTOLIC BLOOD PRESSURE: 135 MMHG | TEMPERATURE: 96.8 F | WEIGHT: 250 LBS | BODY MASS INDEX: 33.86 KG/M2

## 2023-03-16 DIAGNOSIS — C61 PROSTATE CANCER (HCC): Primary | ICD-10-CM

## 2023-03-16 PROCEDURE — 99213 OFFICE O/P EST LOW 20 MIN: CPT | Performed by: NURSE PRACTITIONER

## 2023-03-16 ASSESSMENT — ENCOUNTER SYMPTOMS
NAUSEA: 0
WHEEZING: 0
COLOR CHANGE: 0
SHORTNESS OF BREATH: 0
CONSTIPATION: 0
COUGH: 0
VOMITING: 0
BACK PAIN: 0
ABDOMINAL PAIN: 0
EYE REDNESS: 0

## 2023-03-16 NOTE — PATIENT INSTRUCTIONS
SURVEY:    You may be receiving a survey from [employer] regarding your visit today. Please complete the survey to enable us to provide the highest quality of care to you and your family. If you cannot score us a very good on any question, please call the office to discuss how we could have made your experience a very good one. Thank you.

## 2023-03-16 NOTE — PROGRESS NOTES
HPI:          Patient is a 58 y.o. male in no acute distress. He is alert and oriented to person, place, and time. History  3/2018 Referral from Dr. Carlota Rose for elevated PSA. No family history of prostate cancer, breast cancer or ovarian cancer     4/2018 Prostate biopsy for PSA of 5.54. Pathology: Benign prostate tissue in all 12 cores     PSA  3/2023 - 9.31  12/2022 - 12.29  8/2022 - 12.35  5/2022 - 11.69  1/2022 - 11.6  12/2021 - 10.8  2/2018 - 5.54  1/2018 - 5.25  4/2017 - 4.14       1/2022 prostate MRI showed a PI-RADS 3 lesion in the right posterior medial peripheral zone     5/2022 Prostate biopsy for PSA of 11.69. Pathology: LB 3+3=6, 8%. RB PIN. Grade group IIA, favorable intermediate risk group    Today  Here today to follow-up for prostate cancer. Most recent PSA is 9.31. He is on active surveillance. He denies unintentional weight loss, decreased energy or appetite, new or worsening bone/hip/back pain. He denies any lower extremity numbness and tingling. He denies new or worsening LUTS. He denies gross hematuria or dysuria. Past Medical History:   Diagnosis Date    Autonomic nervous system disorder     Frostbite     History of femoral angiogram      Past Surgical History:   Procedure Laterality Date    FEMORAL BYPASS  11/29/2016    FOOT AMPUTATION THROUGH METATARSAL      times two    SYMPATHECTOMY       Outpatient Encounter Medications as of 3/16/2023   Medication Sig Dispense Refill    Omega 3 1000 MG CAPS Take by mouth daily       Misc Natural Products (OSTEO BI-FLEX ADV DOUBLE ST PO) Take by mouth      albuterol sulfate HFA (PROVENTIL;VENTOLIN;PROAIR) 108 (90 Base) MCG/ACT inhaler Inhale 2 puffs into the lungs every 6 hours as needed for Wheezing      atorvastatin (LIPITOR) 20 MG tablet Take 20 mg by mouth daily      ibuprofen (ADVIL;MOTRIN) 800 MG tablet Take 800 mg by mouth in the morning and at bedtime       No facility-administered encounter medications on file as of 3/16/2023. Current Outpatient Medications on File Prior to Visit   Medication Sig Dispense Refill    Omega 3 1000 MG CAPS Take by mouth daily       Misc Natural Products (OSTEO BI-FLEX ADV DOUBLE ST PO) Take by mouth      albuterol sulfate HFA (PROVENTIL;VENTOLIN;PROAIR) 108 (90 Base) MCG/ACT inhaler Inhale 2 puffs into the lungs every 6 hours as needed for Wheezing      atorvastatin (LIPITOR) 20 MG tablet Take 20 mg by mouth daily      ibuprofen (ADVIL;MOTRIN) 800 MG tablet Take 800 mg by mouth in the morning and at bedtime       No current facility-administered medications on file prior to visit. Patient has no known allergies. Family History   Problem Relation Age of Onset    Cancer Mother      Social History     Tobacco Use   Smoking Status Former    Packs/day: 0.50    Types: Cigarettes    Quit date: 2016    Years since quittin.2   Smokeless Tobacco Never       Social History     Substance and Sexual Activity   Alcohol Use No       Review of Systems   Constitutional:  Negative for appetite change, chills and fever. Eyes:  Negative for redness and visual disturbance. Respiratory:  Negative for cough, shortness of breath and wheezing. Cardiovascular:  Negative for chest pain and leg swelling. Gastrointestinal:  Negative for abdominal pain, constipation, nausea and vomiting. Genitourinary:  Negative for decreased urine volume, difficulty urinating, dysuria, enuresis, flank pain, frequency, hematuria, penile discharge, penile pain, scrotal swelling, testicular pain and urgency. Musculoskeletal:  Negative for back pain, joint swelling and myalgias. Skin:  Negative for color change, rash and wound. Neurological:  Negative for dizziness, tremors and numbness. Hematological:  Negative for adenopathy. Does not bruise/bleed easily.      /81 (Site: Left Upper Arm, Position: Sitting, Cuff Size: Large Adult)   Pulse 63   Temp 96.8 °F (36 °C)   Ht 6' (1.829 m)   Wt 250 lb (113.4 kg)   BMI 33.91 kg/m²       PHYSICAL EXAM:  Constitutional: Patient in no acute distress; Neuro: alert and oriented to person place and time. Psych: Mood and affect normal.  Skin: Normal  Lungs: Respiratory effort normal      Lab Results   Component Value Date    BUN 20 12/06/2022     Lab Results   Component Value Date    CREATININE 1.02 12/06/2022     Lab Results   Component Value Date    PSA 9.31 (H) 03/14/2023    PSA 12.29 (H) 12/06/2022    PSA 12.35 (H) 09/06/2022       ASSESSMENT:   Diagnosis Orders   1. Prostate cancer [facility])              PLAN:  He will remain on active surveillance. We will plan for a repeat PSA in 3 months. This will put him at the 1 year abraham since his prostate biopsy. At this visit we will need to determine if he we will obtain a prostate MRI, then a restaging biopsy or simply a restaging biopsy. His PSA is significantly higher we may consider just moving him to an active treatment group.

## 2023-06-08 ENCOUNTER — TELEPHONE (OUTPATIENT)
Dept: UROLOGY | Age: 63
End: 2023-06-08

## 2023-06-08 DIAGNOSIS — R97.20 ELEVATED PSA: Primary | ICD-10-CM

## 2023-11-06 NOTE — PROGRESS NOTES
HPI: Jassi Martini is an 11 month old former 36 week male with benign macrocephaly here for well child check. Patient was last seen for 4 month [facility].    Parental Concerns: Concerned about ezcema getting worse. Patient has had eczema since birth and over the past month, it has been spreading. Patient has tried triamcinolone 0.025, which is somewhat helpful. Mom will also do aquaphor soak and seal. He will itch at patches. Mom uses long sleeves to keep him from itching. Mom also concerned he will have asthma because of strong family history. Patient has no history of wheezing or difficulty breathing.  Mom is also concerned about patients sleep. Patient wakes up every 2 hours over night. Seems like he just needs comfort. Mom has to comfort him to go back to bed.     Interval History: Saw neurosurgery in May, 2023. No concerns. Advised to follow up in 2 months, however lost insurance temporarily and have not been able to go to follow up.    Patient had ED visit in August for blisters on bilateral elbows. Diagnosed with Impetigo and given mupirocin ointment. Blisters have since resolved.    Mom also reports patient broke out in hives after eating cashews, no difficulty breathing.     Vaccines: DTAP, IPV, PCV, Hib, Hep b, no flu or covid. Declined covid and flu    Lives at home with: Mom, dad, sister. No pets. No smoking at home.  Caregiver well-being: tired but doing well  : No.  Safety: rear facing car seat, has firearm - locked up, ammo and gun separate, Has smoke detector and carbon monoxide in home.    Nutrition: table foods 3x per day. Breast feeds 2x during the day and then multiple times per night. Drinks 3 cups of water per day    Elimination: poops 2-3x per day. Soft.   Sleep: Wakes up every 2 hours over night. 2x naps per day. Sleeps in crib.   Dental: use cloth to clean teeth x 2 days. No dental home.     Development:   Receiving therapies: No    Gross motor: rolling to both sides, sits well without  Radiology called to have order changed to CTA abdomen pelvis in order to obtain runoffs.   Order has been treated support, pulls to stand, crawl, stand without support, walking  Fine motor: Inferior pincer grasp (pads of fingers), lets go of objects intentionally, bangs object together  Social: imitates new gestures, looks for hidden object  Language: “mama, brendan”, copies sounds, can say water and sisters name      SWYC score:  developmental screen score: 20  Baby Pediatric Symptom Checklist score: (normal <3 for each subsection) 2, 2, 4  Family Questions: positive for concern whether food would run out, great difficulty working out arguments with partner         Vitals:   Visit Vitals  Pulse 120   Temp 36.5 °C (97.7 °F)   Resp 26   Ht 71 cm   Wt 9.59 kg   HC 50.5 cm   BMI 19.02 kg/m²   BSA 0.43 m²        Stature percentile: 7 %ile (Z= -1.51) based on WHO (Boys, 0-2 years) Length-for-age data based on Length recorded on 11/6/2023.    Weight percentile: 57 %ile (Z= 0.18) based on WHO (Boys, 0-2 years) weight-for-age data using vitals from 11/6/2023.    Head circumference percentile: >99 %ile (Z= 3.72) based on WHO (Boys, 0-2 years) head circumference-for-age based on Head Circumference recorded on 11/6/2023.       Physical exam:     Gen: well-appearing infant in NAD, alert, well-developed  Head: NCAT, anterior fontanelle open flat soft  HEENT: normal nose without congestion, red reflex intact, no scleral icterus, no ear pits, ear canals patent with pearly gray TMs bilaterally, palate intact  CV: systolic murmur, no MRG, femoral pulses 2+ bilaterally  Pulm: CTAB, no wheezing or crackles, normal WOB, no retractions  Abd: soft, non-tender, non-distended  Skin: Diffuse papules with lichenified patches on bilateral arms and legs  : normal genitalia  Extremities: no deformities  Neuro: normal tone and strength, limbs move symmetrically       Vaccines: vaccines      Assessment/Plan    Jassi Martini is an 11 month old former 36 week male with benign macrocephaly here for well child check. His weight is increasing well, however he has  had a decrease in his height percentile. We will continue to watch his growth closely at follow up visit. On exam, he has diffuse eczema, as well as a new systolic murmur.     #Health Maintenance  -Immunizations: Dtap, Hep B, IPV, PCV, HiB  -RoR book provide  -Provided dentist information  -Flouride varnish applied  -Follow up in 1 month for 1 year [facility]     #Nutrition  -Food for life referral  -Start poly-vi-sol with Iron    #Systolic Murmur  -Cardiology Referral    #Eczema  -Triamcinolone Cream 0.1% to body  -Hydrocortisone 1% to face  -Hydroxyzine for itching  -Continue aquaphor soak and seal.  -Allergy Referral    #Allergic Reaction to Cashews  -Allergy Referral    #Benign Macrocephaly  -Advised follow up with neurosurgery    Raphael Bo MD  Internal Medicine-Pediatrics PGY1  Epic Chat    Patient seen and staffed with Dr. Nguyen.

## 2024-02-08 ENCOUNTER — HOSPITAL ENCOUNTER (OUTPATIENT)
Age: 64
Discharge: HOME | End: 2024-02-08
Payer: COMMERCIAL

## 2024-02-08 DIAGNOSIS — C61 PROSTATE CANCER (HCC): ICD-10-CM

## 2024-02-08 LAB
ALBUMIN SERPL-MCNC: 4 G/DL (ref 3.5–5.2)
ALBUMIN/GLOB SERPL: 1.3 {RATIO} (ref 1–2.5)
ALP SERPL-CCNC: 92 U/L (ref 40–129)
ALT SERPL-CCNC: 24 U/L (ref 5–41)
ANION GAP SERPL CALCULATED.3IONS-SCNC: 12 MMOL/L (ref 9–17)
AST SERPL-CCNC: 18 U/L
BILIRUB SERPL-MCNC: 0.4 MG/DL (ref 0.3–1.2)
BUN SERPL-MCNC: 18 MG/DL (ref 8–23)
BUN/CREAT SERPL: 18 (ref 9–20)
CALCIUM SERPL-MCNC: 9.2 MG/DL (ref 8.6–10.4)
CHLORIDE SERPL-SCNC: 106 MMOL/L (ref 98–107)
CHOLEST SERPL-MCNC: 187 MG/DL
CHOLESTEROL/HDL RATIO: 5.2
CO2 SERPL-SCNC: 25 MMOL/L (ref 20–31)
CREAT SERPL-MCNC: 1 MG/DL (ref 0.7–1.2)
EST. AVERAGE GLUCOSE BLD GHB EST-MCNC: 111 MG/DL
GFR SERPL CREATININE-BSD FRML MDRD: >60 ML/MIN/1.73M2
GLUCOSE SERPL-MCNC: 94 MG/DL (ref 70–99)
HBA1C MFR BLD: 5.5 % (ref 4–6)
HDLC SERPL-MCNC: 36 MG/DL
LDLC SERPL CALC-MCNC: 117 MG/DL (ref 0–130)
POTASSIUM SERPL-SCNC: 4.1 MMOL/L (ref 3.7–5.3)
PROT SERPL-MCNC: 7 G/DL (ref 6.4–8.3)
PSA SERPL-MCNC: 8.06 NG/ML
SODIUM SERPL-SCNC: 143 MMOL/L (ref 135–144)
TRIGL SERPL-MCNC: 170 MG/DL

## 2024-02-08 PROCEDURE — 83036 HEMOGLOBIN GLYCOSYLATED A1C: CPT

## 2024-02-08 PROCEDURE — 84153 ASSAY OF PSA TOTAL: CPT

## 2024-02-08 PROCEDURE — 36415 COLL VENOUS BLD VENIPUNCTURE: CPT

## 2024-02-08 PROCEDURE — 80053 COMPREHEN METABOLIC PANEL: CPT

## 2024-02-08 PROCEDURE — 80061 LIPID PANEL: CPT

## 2024-02-13 ENCOUNTER — OFFICE VISIT (OUTPATIENT)
Dept: UROLOGY | Age: 64
End: 2024-02-13
Payer: COMMERCIAL

## 2024-02-13 VITALS
SYSTOLIC BLOOD PRESSURE: 134 MMHG | DIASTOLIC BLOOD PRESSURE: 76 MMHG | HEIGHT: 72 IN | TEMPERATURE: 97.3 F | RESPIRATION RATE: 18 BRPM | OXYGEN SATURATION: 99 % | HEART RATE: 85 BPM | WEIGHT: 253.8 LBS | BODY MASS INDEX: 34.38 KG/M2

## 2024-02-13 DIAGNOSIS — C61 PROSTATE CANCER (HCC): Primary | ICD-10-CM

## 2024-02-13 PROCEDURE — 99214 OFFICE O/P EST MOD 30 MIN: CPT | Performed by: PHYSICIAN ASSISTANT

## 2024-02-13 PROCEDURE — G8417 CALC BMI ABV UP PARAM F/U: HCPCS | Performed by: PHYSICIAN ASSISTANT

## 2024-02-13 PROCEDURE — 1036F TOBACCO NON-USER: CPT | Performed by: PHYSICIAN ASSISTANT

## 2024-02-13 PROCEDURE — G8484 FLU IMMUNIZE NO ADMIN: HCPCS | Performed by: PHYSICIAN ASSISTANT

## 2024-02-13 PROCEDURE — G8427 DOCREV CUR MEDS BY ELIG CLIN: HCPCS | Performed by: PHYSICIAN ASSISTANT

## 2024-02-13 PROCEDURE — 3017F COLORECTAL CA SCREEN DOC REV: CPT | Performed by: PHYSICIAN ASSISTANT

## 2024-02-13 RX ORDER — CIPROFLOXACIN 500 MG/1
500 TABLET, FILM COATED ORAL 2 TIMES DAILY
Qty: 6 TABLET | Refills: 0 | Status: SHIPPED | OUTPATIENT
Start: 2024-02-13 | End: 2024-02-16

## 2024-02-13 NOTE — PROGRESS NOTES
antibiotics as instructed.  He was told to stop taking ASA and other blood thinners at least a week prior to the biopsy.  He was told to go the ER if he experiences fever 100.1F or greater, chills or severe bleeding after the biopsy. Patient amenable to schedule biopsy.    Follow-up for targeted prostate biopsy    Current recommendations for patients on active surveillance:  Subsequent MRI and/or repeat biopsies should be performed every two to five years. Even if the MRI is negative or stable, we believe that patients should have periodic repeat biopsies, albeit as infrequently as every five years. For patients who cannot undergo MRI (eg, those with pacemakers or other hardware), we perform repeat systematic biopsies every two to four years depending on their risk parameters.

## 2024-02-23 ENCOUNTER — PROCEDURE VISIT (OUTPATIENT)
Dept: UROLOGY | Age: 64
End: 2024-02-23

## 2024-02-23 ENCOUNTER — HOSPITAL ENCOUNTER (OUTPATIENT)
Age: 64
Setting detail: SPECIMEN
Discharge: HOME | End: 2024-02-23

## 2024-02-23 VITALS
SYSTOLIC BLOOD PRESSURE: 124 MMHG | HEART RATE: 73 BPM | TEMPERATURE: 97.3 F | BODY MASS INDEX: 34.04 KG/M2 | DIASTOLIC BLOOD PRESSURE: 87 MMHG | WEIGHT: 251 LBS

## 2024-02-23 DIAGNOSIS — C61 PROSTATE CANCER (HCC): ICD-10-CM

## 2024-02-23 DIAGNOSIS — C61 PROSTATE CANCER (HCC): Primary | ICD-10-CM

## 2024-02-23 DIAGNOSIS — R97.20 ELEVATED PSA: ICD-10-CM

## 2024-02-23 NOTE — PROGRESS NOTES
The following was used during the prostate biopsy without adverse affects:    BARD MAX-CORE  Lot number [id number]  Expiration date 07/31/2026      BIOPSY GUIDE  Lot number [id number]  Expiration date 2-      LIDOCAINE 2% INJECTABLE  Lot number [id number]  Expiration date 08-    Lot number [id number]  Exp 03/31/2026      
has no known allergies.  Family History   Problem Relation Age of Onset    Cancer Mother      Social History     Tobacco Use   Smoking Status Former    Current packs/day: 0.00    Types: Cigarettes    Quit date: 2016    Years since quittin.2   Smokeless Tobacco Never       Social History     Substance and Sexual Activity   Alcohol Use No       Review of Systems    There were no vitals taken for this visit.      PHYSICAL EXAM:  Constitutional: Patient in no acute distress;   Neuro: alert and oriented to person place and time.    Psych: Mood and affect normal.  Skin: Normal  Lungs: Respiratory effort normal  Cardiovascular:  Normal peripheral pulses  Abdomen: Soft, non-tender, non-distended with no CVA, flank pain  Bladder non-tender and not distended.  Lymphatics: no palpable lymphadenopathy  Penis normal  Urethral meatus normal  Scrotal exam normal  Testicles normal bilaterally  Epididymis normal bilaterally  No evidence of inguinal hernia      Lab Results   Component Value Date    BUN 18 2024     Lab Results   Component Value Date    CREATININE 1.0 2024     Lab Results   Component Value Date    PSA 8.06 (H) 2024    PSA 9.69 (H) 2023    PSA 9.31 (H) 2023       ASSESSMENT:  This is a 63 y.o. male with the following diagnoses:   Diagnosis Orders   1. Prostate cancer (HCC)  Surgical Pathology    FL PROSTATE NEEDLE BIOPSY ANY APPROACH    US TRANSRECTAL      2. Elevated PSA  Surgical Pathology    FL PROSTATE NEEDLE BIOPSY ANY APPROACH    US TRANSRECTAL           PLAN:  Patient has saturation biopsy today.  He will follow-up with us in 1 week for biopsy review.

## 2024-02-26 LAB — SURGICAL PATHOLOGY REPORT: NORMAL

## 2024-03-05 ENCOUNTER — OFFICE VISIT (OUTPATIENT)
Dept: UROLOGY | Age: 64
End: 2024-03-05

## 2024-03-05 VITALS
HEIGHT: 72 IN | TEMPERATURE: 97.3 F | DIASTOLIC BLOOD PRESSURE: 78 MMHG | BODY MASS INDEX: 34.13 KG/M2 | SYSTOLIC BLOOD PRESSURE: 123 MMHG | WEIGHT: 252 LBS

## 2024-03-05 DIAGNOSIS — C61 PROSTATE CANCER (HCC): Primary | ICD-10-CM

## 2024-03-05 NOTE — PROGRESS NOTES
History  3/2018 Referral from Dr. Rodriguez for elevated PSA. No family history of prostate cancer, breast cancer or ovarian cancer     4/2018 Prostate biopsy for PSA of 5.54. Pathology: Benign prostate tissue in all 12 cores     PSA  2/2024 - 8.06  6/2023 - 9.69  3/2023 - 9.31  12/2022 - 12.29  8/2022 - 12.35  5/2022 - 11.69  1/2022 - 11.6  12/2021 - 10.8  2/2018 - 5.54  1/2018 - 5.25  4/2017 - 4.14     1/2022 prostate MRI showed a PI-RADS 3 lesion in the right posterior medial peripheral zone     5/2022 Prostate biopsy for PSA of 11.69. Pathology: LB 3+3=6, 8%. RB PIN. Grade group IIA, favorable intermediate risk group    2/2024  restaging biopsy, PSA 8.06. Pathology: benign prostatic tissue in all cores    Today  Here today to follow-up for prostate cancer following restaging biopsy. He denies dysuria, gross hematuria, or fevers. Pathology reviewed and shows: benign prostatic tissue in all cores    Plan  F/U in 6 months with PSA prior

## 2024-04-15 ENCOUNTER — ANESTHESIA (OUTPATIENT)
Dept: OPERATING ROOM | Age: 64
DRG: 271 | End: 2024-04-15
Payer: COMMERCIAL

## 2024-04-15 ENCOUNTER — HOSPITAL ENCOUNTER (EMERGENCY)
Age: 64
Discharge: ANOTHER HOSPITAL | End: 2024-04-15
Attending: EMERGENCY MEDICINE
Payer: COMMERCIAL

## 2024-04-15 ENCOUNTER — APPOINTMENT (OUTPATIENT)
Dept: CT IMAGING | Age: 64
End: 2024-04-15
Payer: COMMERCIAL

## 2024-04-15 ENCOUNTER — APPOINTMENT (OUTPATIENT)
Dept: GENERAL RADIOLOGY | Age: 64
DRG: 271 | End: 2024-04-15
Payer: COMMERCIAL

## 2024-04-15 ENCOUNTER — HOSPITAL ENCOUNTER (INPATIENT)
Age: 64
LOS: 3 days | Discharge: HOME | DRG: 271 | End: 2024-04-18
Attending: EMERGENCY MEDICINE | Admitting: STUDENT IN AN ORGANIZED HEALTH CARE EDUCATION/TRAINING PROGRAM
Payer: COMMERCIAL

## 2024-04-15 ENCOUNTER — ANESTHESIA EVENT (OUTPATIENT)
Dept: OPERATING ROOM | Age: 64
DRG: 271 | End: 2024-04-15
Payer: COMMERCIAL

## 2024-04-15 ENCOUNTER — OFFICE VISIT (OUTPATIENT)
Dept: OPERATING ROOM | Age: 64
End: 2024-04-15
Attending: EMERGENCY MEDICINE

## 2024-04-15 VITALS
SYSTOLIC BLOOD PRESSURE: 134 MMHG | WEIGHT: 248 LBS | HEIGHT: 72 IN | HEART RATE: 77 BPM | DIASTOLIC BLOOD PRESSURE: 77 MMHG | OXYGEN SATURATION: 93 % | BODY MASS INDEX: 33.59 KG/M2 | TEMPERATURE: 98.1 F | RESPIRATION RATE: 17 BRPM

## 2024-04-15 DIAGNOSIS — R09.02 OXYGEN DESATURATION: ICD-10-CM

## 2024-04-15 DIAGNOSIS — J44.1 COPD EXACERBATION (HCC): ICD-10-CM

## 2024-04-15 DIAGNOSIS — L97.411: ICD-10-CM

## 2024-04-15 DIAGNOSIS — I70.201 OCCLUSION OF RIGHT FEMORAL ARTERY (HCC): ICD-10-CM

## 2024-04-15 DIAGNOSIS — Z95.828 S/P FEMORAL-POPLITEAL BYPASS SURGERY: ICD-10-CM

## 2024-04-15 DIAGNOSIS — I70.209 ARTERIAL OCCLUSION, LOWER EXTREMITY (HCC): Primary | ICD-10-CM

## 2024-04-15 DIAGNOSIS — I70.201 OCCLUSION OF RIGHT TIBIAL ARTERY (HCC): Primary | ICD-10-CM

## 2024-04-15 PROBLEM — I70.90 ARTERIAL OCCLUSION: Status: ACTIVE | Noted: 2024-04-15

## 2024-04-15 LAB
ACT BLD: 178 SEC (ref 79–149)
ANION GAP SERPL CALCULATED.3IONS-SCNC: 11 MMOL/L (ref 9–17)
ANTI-XA UNFRAC HEPARIN: 1.27 IU/L
ANTI-XA UNFRAC HEPARIN: 1.63 IU/L
BASOPHILS # BLD: 0.05 K/UL (ref 0–0.2)
BASOPHILS NFR BLD: 0 % (ref 0–2)
BNP SERPL-MCNC: 72 PG/ML (ref 0–300)
BUN SERPL-MCNC: 15 MG/DL (ref 8–23)
BUN/CREAT SERPL: 19 (ref 9–20)
CALCIUM SERPL-MCNC: 9.3 MG/DL (ref 8.6–10.4)
CHLORIDE SERPL-SCNC: 106 MMOL/L (ref 98–107)
CO2 SERPL-SCNC: 24 MMOL/L (ref 20–31)
CREAT SERPL-MCNC: 0.8 MG/DL (ref 0.7–1.2)
ECHO BSA: 2.39 M2
EOSINOPHIL # BLD: 0.11 K/UL (ref 0–0.44)
EOSINOPHILS RELATIVE PERCENT: 1 % (ref 1–4)
ERYTHROCYTE [DISTWIDTH] IN BLOOD BY AUTOMATED COUNT: 13.8 % (ref 11.8–14.4)
ERYTHROCYTE [DISTWIDTH] IN BLOOD BY AUTOMATED COUNT: 14.3 % (ref 11.8–14.4)
ERYTHROCYTE [DISTWIDTH] IN BLOOD BY AUTOMATED COUNT: 14.3 % (ref 11.8–14.4)
FIBRINOGEN PPP-MCNC: 404 MG/DL (ref 203–521)
GFR SERPL CREATININE-BSD FRML MDRD: >90 ML/MIN/1.73M2
GLUCOSE SERPL-MCNC: 97 MG/DL (ref 70–99)
HCT VFR BLD AUTO: 46.9 % (ref 40.7–50.3)
HCT VFR BLD AUTO: 48.7 % (ref 40.7–50.3)
HCT VFR BLD AUTO: 50.9 % (ref 40.7–50.3)
HGB BLD-MCNC: 16 G/DL (ref 13–17)
HGB BLD-MCNC: 16.8 G/DL (ref 13–17)
HGB BLD-MCNC: 16.9 G/DL (ref 13–17)
IMM GRANULOCYTES # BLD AUTO: 0.04 K/UL (ref 0–0.3)
IMM GRANULOCYTES NFR BLD: 0 %
INR PPP: 1
INR PPP: 1
LYMPHOCYTES NFR BLD: 2.14 K/UL (ref 1.1–3.7)
LYMPHOCYTES RELATIVE PERCENT: 17 % (ref 24–43)
MCH RBC QN AUTO: 29.2 PG (ref 25.2–33.5)
MCH RBC QN AUTO: 29.5 PG (ref 25.2–33.5)
MCH RBC QN AUTO: 29.9 PG (ref 25.2–33.5)
MCHC RBC AUTO-ENTMCNC: 33.2 G/DL (ref 28.4–34.8)
MCHC RBC AUTO-ENTMCNC: 34.1 G/DL (ref 28.4–34.8)
MCHC RBC AUTO-ENTMCNC: 34.5 G/DL (ref 28.4–34.8)
MCV RBC AUTO: 85.6 FL (ref 82.6–102.9)
MCV RBC AUTO: 87.5 FL (ref 82.6–102.9)
MCV RBC AUTO: 88.1 FL (ref 82.6–102.9)
MONOCYTES NFR BLD: 1.17 K/UL (ref 0.1–1.2)
MONOCYTES NFR BLD: 10 % (ref 3–12)
NEUTROPHILS NFR BLD: 72 % (ref 36–65)
NEUTS SEG NFR BLD: 8.77 K/UL (ref 1.5–8.1)
NRBC BLD-RTO: 0 PER 100 WBC
PARTIAL THROMBOPLASTIN TIME: 29.9 SEC (ref 26.8–34.8)
PARTIAL THROMBOPLASTIN TIME: 30.1 SEC (ref 26.8–34.8)
PARTIAL THROMBOPLASTIN TIME: >180 SEC (ref 23–36.5)
PARTIAL THROMBOPLASTIN TIME: >180 SEC (ref 23–36.5)
PLATELET # BLD AUTO: 146 K/UL (ref 138–453)
PLATELET # BLD AUTO: 177 K/UL (ref 138–453)
PLATELET # BLD AUTO: 178 K/UL (ref 138–453)
PMV BLD AUTO: 10.4 FL (ref 8.1–13.5)
PMV BLD AUTO: 10.5 FL (ref 8.1–13.5)
PMV BLD AUTO: 10.9 FL (ref 8.1–13.5)
POTASSIUM SERPL-SCNC: 4.1 MMOL/L (ref 3.7–5.3)
PROTHROMBIN TIME: 12.8 SEC (ref 11.7–14.1)
PROTHROMBIN TIME: 13 SEC (ref 11.7–14.9)
RBC # BLD AUTO: 5.36 M/UL (ref 4.21–5.77)
RBC # BLD AUTO: 5.69 M/UL (ref 4.21–5.77)
RBC # BLD AUTO: 5.78 M/UL (ref 4.21–5.77)
SODIUM SERPL-SCNC: 141 MMOL/L (ref 135–144)
TROPONIN I SERPL HS-MCNC: 21 NG/L (ref 0–22)
WBC OTHER # BLD: 11.7 K/UL (ref 3.5–11.3)
WBC OTHER # BLD: 12.3 K/UL (ref 3.5–11.3)
WBC OTHER # BLD: 13.6 K/UL (ref 3.5–11.3)

## 2024-04-15 PROCEDURE — 2709999900 HC NON-CHARGEABLE SUPPLY: Performed by: STUDENT IN AN ORGANIZED HEALTH CARE EDUCATION/TRAINING PROGRAM

## 2024-04-15 PROCEDURE — C1887 CATHETER, GUIDING: HCPCS | Performed by: STUDENT IN AN ORGANIZED HEALTH CARE EDUCATION/TRAINING PROGRAM

## 2024-04-15 PROCEDURE — C1751 CATH, INF, PER/CENT/MIDLINE: HCPCS | Performed by: STUDENT IN AN ORGANIZED HEALTH CARE EDUCATION/TRAINING PROGRAM

## 2024-04-15 PROCEDURE — 96374 THER/PROPH/DIAG INJ IV PUSH: CPT

## 2024-04-15 PROCEDURE — 85730 THROMBOPLASTIN TIME PARTIAL: CPT

## 2024-04-15 PROCEDURE — 85610 PROTHROMBIN TIME: CPT

## 2024-04-15 PROCEDURE — 6360000002 HC RX W HCPCS: Performed by: STUDENT IN AN ORGANIZED HEALTH CARE EDUCATION/TRAINING PROGRAM

## 2024-04-15 PROCEDURE — 36415 COLL VENOUS BLD VENIPUNCTURE: CPT

## 2024-04-15 PROCEDURE — 6360000004 HC RX CONTRAST MEDICATION: Performed by: STUDENT IN AN ORGANIZED HEALTH CARE EDUCATION/TRAINING PROGRAM

## 2024-04-15 PROCEDURE — 80048 BASIC METABOLIC PNL TOTAL CA: CPT

## 2024-04-15 PROCEDURE — B41CYZZ FLUOROSCOPY OF PELVIC ARTERIES USING OTHER CONTRAST: ICD-10-PCS | Performed by: STUDENT IN AN ORGANIZED HEALTH CARE EDUCATION/TRAINING PROGRAM

## 2024-04-15 PROCEDURE — C1892 INTRO/SHEATH,FIXED,PEEL-AWAY: HCPCS | Performed by: STUDENT IN AN ORGANIZED HEALTH CARE EDUCATION/TRAINING PROGRAM

## 2024-04-15 PROCEDURE — 2580000003 HC RX 258

## 2024-04-15 PROCEDURE — 84484 ASSAY OF TROPONIN QUANT: CPT

## 2024-04-15 PROCEDURE — 75635 CT ANGIO ABDOMINAL ARTERIES: CPT

## 2024-04-15 PROCEDURE — 2500000003 HC RX 250 WO HCPCS: Performed by: STUDENT IN AN ORGANIZED HEALTH CARE EDUCATION/TRAINING PROGRAM

## 2024-04-15 PROCEDURE — 3E05317 INTRODUCTION OF OTHER THROMBOLYTIC INTO PERIPHERAL ARTERY, PERCUTANEOUS APPROACH: ICD-10-PCS | Performed by: STUDENT IN AN ORGANIZED HEALTH CARE EDUCATION/TRAINING PROGRAM

## 2024-04-15 PROCEDURE — 37211 THROMBOLYTIC ART THERAPY: CPT | Performed by: STUDENT IN AN ORGANIZED HEALTH CARE EDUCATION/TRAINING PROGRAM

## 2024-04-15 PROCEDURE — 96365 THER/PROPH/DIAG IV INF INIT: CPT

## 2024-04-15 PROCEDURE — 96366 THER/PROPH/DIAG IV INF ADDON: CPT

## 2024-04-15 PROCEDURE — 85384 FIBRINOGEN ACTIVITY: CPT

## 2024-04-15 PROCEDURE — 96375 TX/PRO/DX INJ NEW DRUG ADDON: CPT

## 2024-04-15 PROCEDURE — 3700000000 HC ANESTHESIA ATTENDED CARE: Performed by: STUDENT IN AN ORGANIZED HEALTH CARE EDUCATION/TRAINING PROGRAM

## 2024-04-15 PROCEDURE — 6370000000 HC RX 637 (ALT 250 FOR IP): Performed by: NURSE PRACTITIONER

## 2024-04-15 PROCEDURE — 6360000004 HC RX CONTRAST MEDICATION: Performed by: EMERGENCY MEDICINE

## 2024-04-15 PROCEDURE — 3600000017 HC SURGERY HYBRID ADDL 15MIN: Performed by: STUDENT IN AN ORGANIZED HEALTH CARE EDUCATION/TRAINING PROGRAM

## 2024-04-15 PROCEDURE — 85520 HEPARIN ASSAY: CPT

## 2024-04-15 PROCEDURE — 04HH33Z INSERTION OF INFUSION DEVICE INTO RIGHT EXTERNAL ILIAC ARTERY, PERCUTANEOUS APPROACH: ICD-10-PCS | Performed by: STUDENT IN AN ORGANIZED HEALTH CARE EDUCATION/TRAINING PROGRAM

## 2024-04-15 PROCEDURE — 3600000007 HC SURGERY HYBRID BASE: Performed by: STUDENT IN AN ORGANIZED HEALTH CARE EDUCATION/TRAINING PROGRAM

## 2024-04-15 PROCEDURE — B41DYZZ FLUOROSCOPY OF AORTA AND BILATERAL LOWER EXTREMITY ARTERIES USING OTHER CONTRAST: ICD-10-PCS | Performed by: STUDENT IN AN ORGANIZED HEALTH CARE EDUCATION/TRAINING PROGRAM

## 2024-04-15 PROCEDURE — 6360000002 HC RX W HCPCS

## 2024-04-15 PROCEDURE — C1894 INTRO/SHEATH, NON-LASER: HCPCS | Performed by: STUDENT IN AN ORGANIZED HEALTH CARE EDUCATION/TRAINING PROGRAM

## 2024-04-15 PROCEDURE — C1769 GUIDE WIRE: HCPCS | Performed by: STUDENT IN AN ORGANIZED HEALTH CARE EDUCATION/TRAINING PROGRAM

## 2024-04-15 PROCEDURE — 2580000003 HC RX 258: Performed by: NURSE PRACTITIONER

## 2024-04-15 PROCEDURE — 99285 EMERGENCY DEPT VISIT HI MDM: CPT

## 2024-04-15 PROCEDURE — 71045 X-RAY EXAM CHEST 1 VIEW: CPT

## 2024-04-15 PROCEDURE — 85027 COMPLETE CBC AUTOMATED: CPT

## 2024-04-15 PROCEDURE — 85025 COMPLETE CBC W/AUTO DIFF WBC: CPT

## 2024-04-15 PROCEDURE — 83880 ASSAY OF NATRIURETIC PEPTIDE: CPT

## 2024-04-15 PROCEDURE — A4217 STERILE WATER/SALINE, 500 ML: HCPCS | Performed by: STUDENT IN AN ORGANIZED HEALTH CARE EDUCATION/TRAINING PROGRAM

## 2024-04-15 PROCEDURE — 2580000003 HC RX 258: Performed by: STUDENT IN AN ORGANIZED HEALTH CARE EDUCATION/TRAINING PROGRAM

## 2024-04-15 PROCEDURE — 2000000000 HC ICU R&B

## 2024-04-15 PROCEDURE — 36247 INS CATH ABD/L-EXT ART 3RD: CPT | Performed by: STUDENT IN AN ORGANIZED HEALTH CARE EDUCATION/TRAINING PROGRAM

## 2024-04-15 PROCEDURE — 6360000002 HC RX W HCPCS: Performed by: EMERGENCY MEDICINE

## 2024-04-15 PROCEDURE — 3700000001 HC ADD 15 MINUTES (ANESTHESIA): Performed by: STUDENT IN AN ORGANIZED HEALTH CARE EDUCATION/TRAINING PROGRAM

## 2024-04-15 PROCEDURE — 96376 TX/PRO/DX INJ SAME DRUG ADON: CPT

## 2024-04-15 PROCEDURE — 85347 COAGULATION TIME ACTIVATED: CPT

## 2024-04-15 PROCEDURE — 76937 US GUIDE VASCULAR ACCESS: CPT | Performed by: STUDENT IN AN ORGANIZED HEALTH CARE EDUCATION/TRAINING PROGRAM

## 2024-04-15 RX ORDER — FENTANYL CITRATE 50 UG/ML
50 INJECTION, SOLUTION INTRAMUSCULAR; INTRAVENOUS EVERY 5 MIN PRN
Status: DISCONTINUED | OUTPATIENT
Start: 2024-04-15 | End: 2024-04-16 | Stop reason: HOSPADM

## 2024-04-15 RX ORDER — ONDANSETRON 2 MG/ML
4 INJECTION INTRAMUSCULAR; INTRAVENOUS
Status: DISCONTINUED | OUTPATIENT
Start: 2024-04-15 | End: 2024-04-16 | Stop reason: HOSPADM

## 2024-04-15 RX ORDER — ONDANSETRON 4 MG/1
4 TABLET, ORALLY DISINTEGRATING ORAL EVERY 8 HOURS PRN
Status: DISCONTINUED | OUTPATIENT
Start: 2024-04-15 | End: 2024-04-18 | Stop reason: HOSPADM

## 2024-04-15 RX ORDER — GUAIFENESIN 600 MG/1
600 TABLET, EXTENDED RELEASE ORAL 2 TIMES DAILY
Status: DISCONTINUED | OUTPATIENT
Start: 2024-04-15 | End: 2024-04-18 | Stop reason: HOSPADM

## 2024-04-15 RX ORDER — POLYETHYLENE GLYCOL 3350 17 G/17G
17 POWDER, FOR SOLUTION ORAL DAILY PRN
Status: DISCONTINUED | OUTPATIENT
Start: 2024-04-15 | End: 2024-04-18 | Stop reason: HOSPADM

## 2024-04-15 RX ORDER — SODIUM CHLORIDE 0.9 % (FLUSH) 0.9 %
5-40 SYRINGE (ML) INJECTION EVERY 12 HOURS SCHEDULED
Status: DISCONTINUED | OUTPATIENT
Start: 2024-04-15 | End: 2024-04-18 | Stop reason: HOSPADM

## 2024-04-15 RX ORDER — HEPARIN SODIUM 1000 [USP'U]/ML
4000 INJECTION, SOLUTION INTRAVENOUS; SUBCUTANEOUS PRN
Status: DISCONTINUED | OUTPATIENT
Start: 2024-04-15 | End: 2024-04-15

## 2024-04-15 RX ORDER — HEPARIN SODIUM 10000 [USP'U]/100ML
5-30 INJECTION, SOLUTION INTRAVENOUS CONTINUOUS
Status: DISCONTINUED | OUTPATIENT
Start: 2024-04-15 | End: 2024-04-15 | Stop reason: HOSPADM

## 2024-04-15 RX ORDER — IODIXANOL 320 MG/ML
INJECTION, SOLUTION INTRAVASCULAR
Status: DISPENSED
Start: 2024-04-15 | End: 2024-04-16

## 2024-04-15 RX ORDER — PROPOFOL 10 MG/ML
INJECTION, EMULSION INTRAVENOUS CONTINUOUS PRN
Status: DISCONTINUED | OUTPATIENT
Start: 2024-04-15 | End: 2024-04-15 | Stop reason: SDUPTHER

## 2024-04-15 RX ORDER — IPRATROPIUM BROMIDE AND ALBUTEROL SULFATE 2.5; .5 MG/3ML; MG/3ML
1 SOLUTION RESPIRATORY (INHALATION)
Status: DISCONTINUED | OUTPATIENT
Start: 2024-04-16 | End: 2024-04-18 | Stop reason: HOSPADM

## 2024-04-15 RX ORDER — HYDROMORPHONE HYDROCHLORIDE 1 MG/ML
1 INJECTION, SOLUTION INTRAMUSCULAR; INTRAVENOUS; SUBCUTANEOUS ONCE
Status: COMPLETED | OUTPATIENT
Start: 2024-04-15 | End: 2024-04-15

## 2024-04-15 RX ORDER — HEPARIN SODIUM 10000 [USP'U]/100ML
5-30 INJECTION, SOLUTION INTRAVENOUS CONTINUOUS
Status: DISCONTINUED | OUTPATIENT
Start: 2024-04-15 | End: 2024-04-15

## 2024-04-15 RX ORDER — HEPARIN SODIUM 1000 [USP'U]/ML
2000 INJECTION, SOLUTION INTRAVENOUS; SUBCUTANEOUS PRN
Status: DISCONTINUED | OUTPATIENT
Start: 2024-04-15 | End: 2024-04-15

## 2024-04-15 RX ORDER — ALBUTEROL SULFATE 2.5 MG/3ML
2.5 SOLUTION RESPIRATORY (INHALATION)
Status: DISCONTINUED | OUTPATIENT
Start: 2024-04-15 | End: 2024-04-18 | Stop reason: HOSPADM

## 2024-04-15 RX ORDER — ONDANSETRON 2 MG/ML
4 INJECTION INTRAMUSCULAR; INTRAVENOUS ONCE
Status: COMPLETED | OUTPATIENT
Start: 2024-04-15 | End: 2024-04-15

## 2024-04-15 RX ORDER — HEPARIN SODIUM 1000 [USP'U]/ML
80 INJECTION, SOLUTION INTRAVENOUS; SUBCUTANEOUS PRN
Status: DISCONTINUED | OUTPATIENT
Start: 2024-04-15 | End: 2024-04-15 | Stop reason: HOSPADM

## 2024-04-15 RX ORDER — HYDRALAZINE HYDROCHLORIDE 20 MG/ML
10 INJECTION INTRAMUSCULAR; INTRAVENOUS
Status: DISCONTINUED | OUTPATIENT
Start: 2024-04-15 | End: 2024-04-16 | Stop reason: HOSPADM

## 2024-04-15 RX ORDER — SODIUM CHLORIDE 9 MG/ML
INJECTION, SOLUTION INTRAVENOUS PRN
Status: DISCONTINUED | OUTPATIENT
Start: 2024-04-15 | End: 2024-04-18 | Stop reason: HOSPADM

## 2024-04-15 RX ORDER — ACETAMINOPHEN 650 MG/1
650 SUPPOSITORY RECTAL EVERY 6 HOURS PRN
Status: DISCONTINUED | OUTPATIENT
Start: 2024-04-15 | End: 2024-04-16

## 2024-04-15 RX ORDER — ACETAMINOPHEN 325 MG/1
650 TABLET ORAL EVERY 6 HOURS PRN
Status: DISCONTINUED | OUTPATIENT
Start: 2024-04-15 | End: 2024-04-16

## 2024-04-15 RX ORDER — HEPARIN SODIUM 1000 [USP'U]/ML
40 INJECTION, SOLUTION INTRAVENOUS; SUBCUTANEOUS PRN
Status: DISCONTINUED | OUTPATIENT
Start: 2024-04-15 | End: 2024-04-15 | Stop reason: HOSPADM

## 2024-04-15 RX ORDER — LIDOCAINE HYDROCHLORIDE 10 MG/ML
INJECTION, SOLUTION EPIDURAL; INFILTRATION; INTRACAUDAL; PERINEURAL PRN
Status: DISCONTINUED | OUTPATIENT
Start: 2024-04-15 | End: 2024-04-15

## 2024-04-15 RX ORDER — HEPARIN SODIUM 10000 [USP'U]/100ML
5-30 INJECTION, SOLUTION INTRAVENOUS CONTINUOUS
Status: DISCONTINUED | OUTPATIENT
Start: 2024-04-15 | End: 2024-04-18

## 2024-04-15 RX ORDER — SODIUM CHLORIDE 0.9 % (FLUSH) 0.9 %
5-40 SYRINGE (ML) INJECTION EVERY 12 HOURS SCHEDULED
Status: DISCONTINUED | OUTPATIENT
Start: 2024-04-15 | End: 2024-04-16 | Stop reason: HOSPADM

## 2024-04-15 RX ORDER — HEPARIN SODIUM 1000 [USP'U]/ML
40 INJECTION, SOLUTION INTRAVENOUS; SUBCUTANEOUS PRN
Status: DISCONTINUED | OUTPATIENT
Start: 2024-04-15 | End: 2024-04-18

## 2024-04-15 RX ORDER — MORPHINE SULFATE 4 MG/ML
4 INJECTION, SOLUTION INTRAMUSCULAR; INTRAVENOUS ONCE
Status: COMPLETED | OUTPATIENT
Start: 2024-04-15 | End: 2024-04-15

## 2024-04-15 RX ORDER — HEPARIN SODIUM 1000 [USP'U]/ML
80 INJECTION, SOLUTION INTRAVENOUS; SUBCUTANEOUS PRN
Status: DISCONTINUED | OUTPATIENT
Start: 2024-04-15 | End: 2024-04-18

## 2024-04-15 RX ORDER — SODIUM CHLORIDE, SODIUM LACTATE, POTASSIUM CHLORIDE, CALCIUM CHLORIDE 600; 310; 30; 20 MG/100ML; MG/100ML; MG/100ML; MG/100ML
INJECTION, SOLUTION INTRAVENOUS CONTINUOUS PRN
Status: DISCONTINUED | OUTPATIENT
Start: 2024-04-15 | End: 2024-04-15 | Stop reason: SDUPTHER

## 2024-04-15 RX ORDER — WATER 10 ML/10ML
INJECTION INTRAMUSCULAR; INTRAVENOUS; SUBCUTANEOUS
Status: DISPENSED
Start: 2024-04-15 | End: 2024-04-16

## 2024-04-15 RX ORDER — HEPARIN SODIUM 1000 [USP'U]/ML
INJECTION, SOLUTION INTRAVENOUS; SUBCUTANEOUS PRN
Status: DISCONTINUED | OUTPATIENT
Start: 2024-04-15 | End: 2024-04-15 | Stop reason: SDUPTHER

## 2024-04-15 RX ORDER — HEPARIN SODIUM 10000 [USP'U]/100ML
500 INJECTION, SOLUTION INTRAVENOUS CONTINUOUS
Status: DISCONTINUED | OUTPATIENT
Start: 2024-04-15 | End: 2024-04-16

## 2024-04-15 RX ORDER — GUAIFENESIN/DEXTROMETHORPHAN 100-10MG/5
5 SYRUP ORAL EVERY 4 HOURS PRN
Status: DISCONTINUED | OUTPATIENT
Start: 2024-04-15 | End: 2024-04-18 | Stop reason: HOSPADM

## 2024-04-15 RX ORDER — SODIUM CHLORIDE 0.9 % (FLUSH) 0.9 %
5-40 SYRINGE (ML) INJECTION PRN
Status: DISCONTINUED | OUTPATIENT
Start: 2024-04-15 | End: 2024-04-16 | Stop reason: HOSPADM

## 2024-04-15 RX ORDER — NALOXONE HYDROCHLORIDE 0.4 MG/ML
INJECTION, SOLUTION INTRAMUSCULAR; INTRAVENOUS; SUBCUTANEOUS PRN
Status: DISCONTINUED | OUTPATIENT
Start: 2024-04-15 | End: 2024-04-16 | Stop reason: HOSPADM

## 2024-04-15 RX ORDER — CEFAZOLIN SODIUM 1 G/3ML
INJECTION, POWDER, FOR SOLUTION INTRAMUSCULAR; INTRAVENOUS PRN
Status: DISCONTINUED | OUTPATIENT
Start: 2024-04-15 | End: 2024-04-15 | Stop reason: SDUPTHER

## 2024-04-15 RX ORDER — SODIUM CHLORIDE 9 MG/ML
INJECTION, SOLUTION INTRAVENOUS PRN
Status: DISCONTINUED | OUTPATIENT
Start: 2024-04-15 | End: 2024-04-16 | Stop reason: HOSPADM

## 2024-04-15 RX ORDER — HEPARIN SODIUM 1000 [USP'U]/ML
80 INJECTION, SOLUTION INTRAVENOUS; SUBCUTANEOUS ONCE
Status: COMPLETED | OUTPATIENT
Start: 2024-04-15 | End: 2024-04-15

## 2024-04-15 RX ORDER — IODIXANOL 320 MG/ML
INJECTION, SOLUTION INTRAVASCULAR PRN
Status: DISCONTINUED | OUTPATIENT
Start: 2024-04-15 | End: 2024-04-15

## 2024-04-15 RX ORDER — SODIUM CHLORIDE 0.9 % (FLUSH) 0.9 %
5-40 SYRINGE (ML) INJECTION PRN
Status: DISCONTINUED | OUTPATIENT
Start: 2024-04-15 | End: 2024-04-18 | Stop reason: HOSPADM

## 2024-04-15 RX ORDER — ONDANSETRON 2 MG/ML
4 INJECTION INTRAMUSCULAR; INTRAVENOUS EVERY 6 HOURS PRN
Status: DISCONTINUED | OUTPATIENT
Start: 2024-04-15 | End: 2024-04-18 | Stop reason: HOSPADM

## 2024-04-15 RX ORDER — FENTANYL CITRATE 50 UG/ML
25 INJECTION, SOLUTION INTRAMUSCULAR; INTRAVENOUS EVERY 5 MIN PRN
Status: DISCONTINUED | OUTPATIENT
Start: 2024-04-15 | End: 2024-04-16 | Stop reason: HOSPADM

## 2024-04-15 RX ADMIN — ALTEPLASE 1 MG/HR: 2.2 INJECTION, POWDER, LYOPHILIZED, FOR SOLUTION INTRAVENOUS at 21:40

## 2024-04-15 RX ADMIN — HYDROMORPHONE HYDROCHLORIDE 1 MG: 1 INJECTION, SOLUTION INTRAMUSCULAR; INTRAVENOUS; SUBCUTANEOUS at 18:40

## 2024-04-15 RX ADMIN — FENTANYL CITRATE 25 MCG: 50 INJECTION INTRAMUSCULAR; INTRAVENOUS at 22:28

## 2024-04-15 RX ADMIN — HEPARIN SODIUM 10000 UNITS: 1000 INJECTION INTRAVENOUS; SUBCUTANEOUS at 21:08

## 2024-04-15 RX ADMIN — GUAIFENESIN 600 MG: 600 TABLET, EXTENDED RELEASE ORAL at 22:52

## 2024-04-15 RX ADMIN — CEFAZOLIN 2 G: 1 INJECTION, POWDER, FOR SOLUTION INTRAMUSCULAR; INTRAVENOUS at 20:54

## 2024-04-15 RX ADMIN — SODIUM CHLORIDE, POTASSIUM CHLORIDE, SODIUM LACTATE AND CALCIUM CHLORIDE: 600; 310; 30; 20 INJECTION, SOLUTION INTRAVENOUS at 20:51

## 2024-04-15 RX ADMIN — PROPOFOL 80 MCG/KG/MIN: 10 INJECTION, EMULSION INTRAVENOUS at 20:51

## 2024-04-15 RX ADMIN — HEPARIN SODIUM 18 UNITS/KG/HR: 10000 INJECTION, SOLUTION INTRAVENOUS at 18:29

## 2024-04-15 RX ADMIN — LIDOCAINE HYDROCHLORIDE 30 MG: 10 INJECTION, SOLUTION EPIDURAL; INFILTRATION; INTRACAUDAL; PERINEURAL at 20:50

## 2024-04-15 RX ADMIN — HEPARIN SODIUM 9000 UNITS: 1000 INJECTION INTRAVENOUS; SUBCUTANEOUS at 13:16

## 2024-04-15 RX ADMIN — ONDANSETRON 4 MG: 2 INJECTION INTRAMUSCULAR; INTRAVENOUS at 10:59

## 2024-04-15 RX ADMIN — HYDROMORPHONE HYDROCHLORIDE 1 MG: 1 INJECTION, SOLUTION INTRAMUSCULAR; INTRAVENOUS; SUBCUTANEOUS at 16:42

## 2024-04-15 RX ADMIN — SODIUM CHLORIDE, PRESERVATIVE FREE 10 ML: 5 INJECTION INTRAVENOUS at 22:16

## 2024-04-15 RX ADMIN — IOPAMIDOL 110 ML: 755 INJECTION, SOLUTION INTRAVENOUS at 11:14

## 2024-04-15 RX ADMIN — HEPARIN SODIUM 15 UNITS/KG/HR: 10000 INJECTION, SOLUTION INTRAVENOUS at 20:14

## 2024-04-15 RX ADMIN — HEPARIN SODIUM 500 UNITS/HR: 10000 INJECTION, SOLUTION INTRAVENOUS at 21:40

## 2024-04-15 RX ADMIN — HEPARIN SODIUM 18 UNITS/KG/HR: 10000 INJECTION, SOLUTION INTRAVENOUS at 13:13

## 2024-04-15 RX ADMIN — MORPHINE SULFATE 4 MG: 4 INJECTION, SOLUTION INTRAMUSCULAR; INTRAVENOUS at 10:59

## 2024-04-15 RX ADMIN — HYDROMORPHONE HYDROCHLORIDE 1 MG: 1 INJECTION, SOLUTION INTRAMUSCULAR; INTRAVENOUS; SUBCUTANEOUS at 13:46

## 2024-04-15 ASSESSMENT — PAIN SCALES - GENERAL
PAINLEVEL_OUTOF10: 10
PAINLEVEL_OUTOF10: 8
PAINLEVEL_OUTOF10: 10
PAINLEVEL_OUTOF10: 3
PAINLEVEL_OUTOF10: 6

## 2024-04-15 ASSESSMENT — PAIN DESCRIPTION - ORIENTATION
ORIENTATION: RIGHT

## 2024-04-15 ASSESSMENT — PAIN DESCRIPTION - LOCATION
LOCATION: LEG

## 2024-04-15 ASSESSMENT — LIFESTYLE VARIABLES: SMOKING_STATUS: 1

## 2024-04-15 ASSESSMENT — ENCOUNTER SYMPTOMS
ABDOMINAL DISTENTION: 0
SHORTNESS OF BREATH: 0
SORE THROAT: 0
BACK PAIN: 0

## 2024-04-15 ASSESSMENT — PAIN - FUNCTIONAL ASSESSMENT: PAIN_FUNCTIONAL_ASSESSMENT: 0-10

## 2024-04-15 NOTE — ED PROVIDER NOTES
0925 04/15/24 1237   BP:  114/64    Pulse: 100     Resp: 16     Temp: 98.1 °F (36.7 °C)     TempSrc: Oral     SpO2: 93%     Weight:   112.5 kg (248 lb)   Height:   1.829 m (6')       MDM     Amount and/or Complexity of Data Reviewed  Clinical lab tests: ordered and reviewed  Tests in the radiology section of CPT®: ordered and reviewed  Tests in the medicine section of CPT®: reviewed and ordered  Decide to obtain previous medical records or to obtain history from someone other than the patient: yes  Review and summarize past medical records: yes  Discuss the patient with other providers: yes  Independent visualization of images, tracings, or specimens: yes    Risk of Complications, Morbidity, and/or Mortality  Presenting problems: moderate  Diagnostic procedures: moderate  Management options: moderate    Patient Progress  Patient progress: stable          REASSESSMENT     ED Course as of 04/15/24 1315   Mon Apr 15, 2024   1237 Spoke with the Dr. Abdul, vascular surgeon who recommends transferring the patient to [facility].  He states that he will coordinate the transfer to [facility] by talking to Dr. Enciso.  He will call us back. [JI]   1311 Dr. Abdul spoke with Dr. Carbajal, vascular surgery at [facility] who will evaluate excepted transfer.  He requests transferring the patient from our emergency department to the [facility] emergency department. [JI]   1314 I spoke with Dr. Rogers, ED physician at [facility] who will accept the patient to their emergency department. [JI]   1315 Inform patient and family that we will transfer him back on ambulance to [facility] for further management and evaluation.  They are agreeable with the plan. [JI]      ED Course User Index  [JI] Flori Coles,            FINAL IMPRESSION      1. Arterial occlusion, right lower extremity (HCC)          DISPOSITION/PLAN   DISPOSITION Decision To Transfer 04/15/2024 12:56:30 PM      PATIENT REFERRED TO:  No follow-up

## 2024-04-15 NOTE — ANESTHESIA PRE PROCEDURE
Department of Anesthesiology  Preprocedure Note       Name:  Crispin Marcum   Age:  63 y.o.  :  1960                                          MRN:  [id number]         Date:  4/15/2024      Surgeon: Surgeon(s):  Kiarra Marin MD    Procedure: Procedure(s):  RIGHT LEG ANGIOGRAM, LYSIS CATHETER PLACEMENT    Medications prior to admission:   Prior to Admission medications    Medication Sig Start Date End Date Taking? Authorizing Provider   Apoaequorin (PREVAGEN PO) Take by mouth daily    Karthikeyan Nettles MD   [facility] Natural Products (OSTEO BI-FLEX ADV DOUBLE ST PO) Take by mouth daily    Karthikeyan Nettles MD   albuterol sulfate HFA (PROVENTIL;VENTOLIN;PROAIR) 108 (90 Base) MCG/ACT inhaler Inhale 2 puffs into the lungs every 6 hours as needed for Wheezing    Karthikeyan Nettles MD   atorvastatin (LIPITOR) 20 MG tablet Take 1 tablet by mouth daily    Karthikeyan Nettles MD       Current medications:    Current Facility-Administered Medications   Medication Dose Route Frequency Provider Last Rate Last Admin    heparin (porcine) injection 9,000 Units  80 Units/kg IntraVENous PRN Truong Best MD        heparin (porcine) injection 4,500 Units  40 Units/kg IntraVENous PRN Truong Best MD        heparin 25,000 units in dextrose 5% 250 mL (premix) infusion  5-30 Units/kg/hr IntraVENous Continuous Truong Best MD   Paused at 04/15/24 1912     Current Outpatient Medications   Medication Sig Dispense Refill    Apoaequorin (PREVAGEN PO) Take by mouth daily      Misc Natural Products (OSTEO BI-FLEX ADV DOUBLE ST PO) Take by mouth daily      albuterol sulfate HFA (PROVENTIL;VENTOLIN;PROAIR) 108 (90 Base) MCG/ACT inhaler Inhale 2 puffs into the lungs every 6 hours as needed for Wheezing      atorvastatin (LIPITOR) 20 MG tablet Take 1 tablet by mouth daily         Allergies:  No Known Allergies    Problem List:    Patient Active Problem List   Diagnosis Code    Chronic ulcer of right midfoot

## 2024-04-15 NOTE — ED TRIAGE NOTES
Patient presents to the ED via EMS as a transfer from [place]. Patient has known Right Iliac artery occulusion.Patient was placed on bedside monitor, IV established prior to arrival, bed in lowest position and call light with in reach. Resident is bedside for evaluation. Writer will continue with plan of care.

## 2024-04-15 NOTE — ED NOTES
62 YO M with ischemic limb bilateral partial amputations 3 day increase sensation in RLE.  Foot left is purple and now from yesterday purple.  External iliac to tibia.  Heparin started.

## 2024-04-15 NOTE — ED PROVIDER NOTES
[facility] ED  Emergency Department Encounter  Emergency Medicine Resident     Pt Name:Crispin Marcum  MRN: [id number]  Birthdate 1960  Date of evaluation: 4/15/24  PCP:  Gio Rodriguez DO  Note Started: 6:15 PM EDT      CHIEF COMPLAINT       Chief Complaint   Patient presents with    Circulatory Problem       HISTORY OF PRESENT ILLNESS  (Location/Symptom, Timing/Onset, Context/Setting, Quality, Duration, Modifying Factors, Severity.)      Crispin Marcum is a 63 y.o. male significant pertinent medical history of smoking, previous arterial occlusion of the right right iliac artery with surgical collaterals who presents with close in the right external iliac artery, right common femoral artery, profunda femoris artery, and right superficial femoral artery.  There is collateral blood flow from the right gluteal arteries, however there is occlusion of right anterior tibial and posterior tibial arteries.    Patient states this originally began a few weeks ago, when he was bending over to do some work.  Stated that it began this burning sensation on the anterior portion of his leg.  He used some Bengay, and he states that the next day it was burning in his Achilles.  It continued this way, until he presented to [facility].    At [facility], the patient underwent a workup that included a CTA of the abdomen with runoffs that showed the above findings.  Patient did not have a pulse distal.    On arrival, patient has coolness of the right foot distal to the occlusion.  There is some bluish discoloration of the skin, however capillary refill is noted.    Patient was started on anticoagulation prior to arrival, the patient arrived with heparin running.  Will continue heparin, and will consult vascular surgery.    This is the read of the CT from outlying facility:    IMPRESSION:  Occlusion of the right external iliac artery, right common femoral artery,  profundus femoris artery, and right

## 2024-04-15 NOTE — ED PROVIDER NOTES
[facility]     Emergency Department     Faculty Attestation    I performed a history and physical examination of the patient and discussed management with the resident. I reviewed the resident’s note and agree with the documented findings and plan of care. Any areas of disagreement are noted on the chart. I was personally present for the key portions of any procedures. I have documented in the chart those procedures where I was not present during the key portions. I have reviewed the emergency nurses triage note. I agree with the chief complaint, past medical history, past surgical history, allergies, medications, social and family history as documented unless otherwise noted below. Documentation of the HPI, Physical Exam and Medical Decision Making performed by medical students or scribes is based on my personal performance of the HPI, PE and MDM. For Physician Assistant/ Nurse Practitioner cases/documentation I have personally evaluated this patient and have completed at least one if not all key elements of the E/M (history, physical exam, and MDM). Additional findings are as noted.    Vital signs:   Vitals:    04/15/24 1804   BP:    Pulse: 86   Resp: 14   Temp:    SpO2: 95%      Transfer from [facility] for a right lower extremity arterial occlusion. Prior known history of PVD. Bilateral transmetatarsal amputations due to frostbite in the remote past. On a heparin drip. Plan to discuss with vascular surgery.    CTA ABDOMINAL AORTA W BILAT RUNOFF W CONTRAST    Result Date: 4/15/2024  EXAMINATION: CTA OF THE AORTA WITH LOWER EXTREMITY RUNOFF 4/15/2024 11:14 am TECHNIQUE: CTA of the pelvis and bilateral lower extremities was performed after the administration of intravenous contrast.   Multiplanar reformatted images are provided for review.  MIP images are provided for review. Automated exposure control, iterative reconstruction, and/or weight based adjustment of the mA/kV was utilized to  aspect the right foot which measures approximately 2.4 cm in AP.  No localized collection prior right-sided forefoot amputation. VASCULAR Atherosclerotic calcifications involving the abdominal aorta and its branch structures.  Dilatation of the infrarenal abdominal aorta which measures at maximum 4.2 cm in AP.  Prominent associated soft tissue plaque.  Celiac trunk, superior mesenteric artery, renal arteries, and inferior mesenteric artery are patent. Occlusion of the proximal right external iliac artery, right common femoral artery, profunda femoris artery, right superficial femoral artery, and proximal popliteal artery.  Collateral blood flow from gluteal arteries results in opacification of the distal right popliteal artery.  Anterior tibial artery does not appear to pacified with contrast and is likely thrombosed.  Similar findings seen involving the posterior tibial artery. Peroneal artery opacifies with contrast to the level of the distal leg. Moderate atherosclerotic plaque involving the left external iliac artery which results in maximum of 80% stenosis.  Left external iliac artery plaque results in a maximum of approximately 30% stenosis.  Left common femoral arteries patent.  Profundus femoris artery is patent.  Left superficial femoral artery and left popliteal artery are patent.  Portion of the anterior tibial artery is patent.  Aberrant location of the anterior tibial artery versus collateral opacified vessel in the anterior leg.  Non opacification of the proximal posterior tibial artery.  Opacification of the distal posterior tibial artery.  Peroneal artery is patent to the ankle.  Multiple collateral vessels in the leg which results in opacification of the distal posterior tibial artery.  Multiple enhancing collateral vessels are seen in the ankle and foot.     Occlusion of the right external iliac artery, right common femoral artery, profundus femoris artery, and right superficial femoral artery.

## 2024-04-15 NOTE — ED PROVIDER NOTES
aorta and its branch structures.  Dilatation of the infrarenal abdominal aorta which measures at maximum 4.2 cm in AP.  Prominent associated soft tissue plaque.  Celiac trunk, superior mesenteric artery, renal arteries, and inferior mesenteric artery are patent. Occlusion of the proximal right external iliac artery, right common femoral artery, profunda femoris artery, right superficial femoral artery, and proximal popliteal artery.  Collateral blood flow from gluteal arteries results in opacification of the distal right popliteal artery.  Anterior tibial artery does not appear to pacified with contrast and is likely thrombosed.  Similar findings seen involving the posterior tibial artery. Peroneal artery opacifies with contrast to the level of the distal leg. Moderate atherosclerotic plaque involving the left external iliac artery which results in maximum of 80% stenosis.  Left external iliac artery plaque results in a maximum of approximately 30% stenosis.  Left common femoral arteries patent.  Profundus femoris artery is patent.  Left superficial femoral artery and left popliteal artery are patent.  Portion of the anterior tibial artery is patent.  Aberrant location of the anterior tibial artery versus collateral opacified vessel in the anterior leg.  Non opacification of the proximal posterior tibial artery.  Opacification of the distal posterior tibial artery.  Peroneal artery is patent to the ankle.  Multiple collateral vessels in the leg which results in opacification of the distal posterior tibial artery.  Multiple enhancing collateral vessels are seen in the ankle and foot.     Occlusion of the right external iliac artery, right common femoral artery, profundus femoris artery, and right superficial femoral artery.  Collateral blood flow from the right gluteal arteries results in opacification of the right popliteal artery.  Occlusion of the right anterior tibial and posterior tibial arteries is also noted  diagnosis of COPD.     Patient denies any signs sputum production. [AS]   2046 Intermed to admit. [AS]      ED Course User Index  [AS] Brennon Chandler DO  [WH] Truong Best MD       OUTSTANDING TASKS / RECOMMENDATIONS:    Admit to medicine for COPD exacerbation  Follow up on trops     FINAL IMPRESSION:   No diagnosis found.    DISPOSITION:         DISPOSITION:  []  Discharge   []  Transfer -    [x]  Admission -     []  Against Medical Advice   []  Eloped   FOLLOW-UP: No follow-up provider specified.   DISCHARGE MEDICATIONS: New Prescriptions    No medications on file          Brennon Chandler DO  Emergency Medicine Resident  [facility]

## 2024-04-16 ENCOUNTER — ANESTHESIA EVENT (OUTPATIENT)
Dept: OPERATING ROOM | Age: 64
DRG: 271 | End: 2024-04-16
Payer: COMMERCIAL

## 2024-04-16 ENCOUNTER — ANESTHESIA (OUTPATIENT)
Dept: OPERATING ROOM | Age: 64
DRG: 271 | End: 2024-04-16
Payer: COMMERCIAL

## 2024-04-16 ENCOUNTER — APPOINTMENT (OUTPATIENT)
Dept: GENERAL RADIOLOGY | Age: 64
DRG: 271 | End: 2024-04-16
Payer: COMMERCIAL

## 2024-04-16 ENCOUNTER — OFFICE VISIT (OUTPATIENT)
Dept: OPERATING ROOM | Age: 64
End: 2024-04-16
Attending: STUDENT IN AN ORGANIZED HEALTH CARE EDUCATION/TRAINING PROGRAM

## 2024-04-16 PROBLEM — Z72.0 TOBACCO ABUSE: Status: ACTIVE | Noted: 2024-04-16

## 2024-04-16 PROBLEM — R29.818 SUSPECTED SLEEP APNEA: Status: ACTIVE | Noted: 2024-04-16

## 2024-04-16 PROBLEM — J42 CHRONIC BRONCHITIS (HCC): Status: ACTIVE | Noted: 2024-04-16

## 2024-04-16 PROBLEM — Z95.828 S/P FEMORAL-POPLITEAL BYPASS SURGERY: Status: ACTIVE | Noted: 2024-04-16

## 2024-04-16 PROBLEM — I99.8 ACUTE LOWER LIMB ISCHEMIA: Status: ACTIVE | Noted: 2024-04-16

## 2024-04-16 LAB
ALBUMIN SERPL-MCNC: 3.9 G/DL (ref 3.5–5.2)
ALBUMIN/GLOB SERPL: 2 {RATIO} (ref 1–2.5)
ALP SERPL-CCNC: 80 U/L (ref 40–129)
ALT SERPL-CCNC: 24 U/L (ref 10–50)
ANION GAP SERPL CALCULATED.3IONS-SCNC: 12 MMOL/L (ref 9–16)
ANTI-XA UNFRAC HEPARIN: <0.1 IU/L
ANTI-XA UNFRAC HEPARIN: <0.1 IU/L
AST SERPL-CCNC: 64 U/L (ref 10–50)
BILIRUB DIRECT SERPL-MCNC: <0.2 MG/DL (ref 0–0.3)
BILIRUB INDIRECT SERPL-MCNC: 0.3 MG/DL (ref 0–1)
BILIRUB SERPL-MCNC: 0.4 MG/DL (ref 0–1.2)
BUN SERPL-MCNC: 16 MG/DL (ref 8–23)
CALCIUM SERPL-MCNC: 8.7 MG/DL (ref 8.6–10.4)
CHLORIDE SERPL-SCNC: 107 MMOL/L (ref 98–107)
CO2 SERPL-SCNC: 21 MMOL/L (ref 20–31)
CREAT SERPL-MCNC: 0.9 MG/DL (ref 0.7–1.2)
ECHO BSA: 2.39 M2
ERYTHROCYTE [DISTWIDTH] IN BLOOD BY AUTOMATED COUNT: 14 % (ref 11.8–14.4)
ERYTHROCYTE [DISTWIDTH] IN BLOOD BY AUTOMATED COUNT: 14.4 % (ref 11.8–14.4)
EST. AVERAGE GLUCOSE BLD GHB EST-MCNC: 114 MG/DL
FIBRINOGEN PPP-MCNC: 356 MG/DL (ref 203–521)
FIBRINOGEN PPP-MCNC: 404 MG/DL (ref 203–521)
GFR SERPL CREATININE-BSD FRML MDRD: >90 ML/MIN/1.73M2
GLOBULIN SER CALC-MCNC: 2.5 G/DL
GLUCOSE SERPL-MCNC: 108 MG/DL (ref 74–99)
HBA1C MFR BLD: 5.6 % (ref 4–6)
HCT VFR BLD AUTO: 46.3 % (ref 40.7–50.3)
HCT VFR BLD AUTO: 46.3 % (ref 40.7–50.3)
HGB BLD-MCNC: 14.9 G/DL (ref 13–17)
HGB BLD-MCNC: 15 G/DL (ref 13–17)
MCH RBC QN AUTO: 29.2 PG (ref 25.2–33.5)
MCH RBC QN AUTO: 29.4 PG (ref 25.2–33.5)
MCHC RBC AUTO-ENTMCNC: 32.2 G/DL (ref 28.4–34.8)
MCHC RBC AUTO-ENTMCNC: 32.4 G/DL (ref 28.4–34.8)
MCV RBC AUTO: 90.3 FL (ref 82.6–102.9)
MCV RBC AUTO: 91.3 FL (ref 82.6–102.9)
NRBC BLD-RTO: 0 PER 100 WBC
NRBC BLD-RTO: 0 PER 100 WBC
PARTIAL THROMBOPLASTIN TIME: 31.6 SEC (ref 23–36.5)
PARTIAL THROMBOPLASTIN TIME: 31.7 SEC (ref 23–36.5)
PLATELET # BLD AUTO: 149 K/UL (ref 138–453)
PLATELET # BLD AUTO: NORMAL K/UL (ref 138–453)
PLATELET, FLUORESCENCE: 144 K/UL (ref 138–453)
PLATELETS.RETICULATED NFR BLD AUTO: 4.6 % (ref 1.1–10.3)
PMV BLD AUTO: 10.6 FL (ref 8.1–13.5)
POTASSIUM SERPL-SCNC: 4.5 MMOL/L (ref 3.7–5.3)
PROT SERPL-MCNC: 6.4 G/DL (ref 6.6–8.7)
RBC # BLD AUTO: 5.07 M/UL (ref 4.21–5.77)
RBC # BLD AUTO: 5.13 M/UL (ref 4.21–5.77)
SODIUM SERPL-SCNC: 140 MMOL/L (ref 136–145)
WBC OTHER # BLD: 10.3 K/UL (ref 3.5–11.3)
WBC OTHER # BLD: 11.6 K/UL (ref 3.5–11.3)

## 2024-04-16 PROCEDURE — 3600000007 HC SURGERY HYBRID BASE: Performed by: STUDENT IN AN ORGANIZED HEALTH CARE EDUCATION/TRAINING PROGRAM

## 2024-04-16 PROCEDURE — 3E05317 INTRODUCTION OF OTHER THROMBOLYTIC INTO PERIPHERAL ARTERY, PERCUTANEOUS APPROACH: ICD-10-PCS | Performed by: STUDENT IN AN ORGANIZED HEALTH CARE EDUCATION/TRAINING PROGRAM

## 2024-04-16 PROCEDURE — 85730 THROMBOPLASTIN TIME PARTIAL: CPT

## 2024-04-16 PROCEDURE — C1769 GUIDE WIRE: HCPCS | Performed by: STUDENT IN AN ORGANIZED HEALTH CARE EDUCATION/TRAINING PROGRAM

## 2024-04-16 PROCEDURE — 6370000000 HC RX 637 (ALT 250 FOR IP): Performed by: INTERNAL MEDICINE

## 2024-04-16 PROCEDURE — 6360000002 HC RX W HCPCS: Performed by: STUDENT IN AN ORGANIZED HEALTH CARE EDUCATION/TRAINING PROGRAM

## 2024-04-16 PROCEDURE — 2709999900 HC NON-CHARGEABLE SUPPLY: Performed by: STUDENT IN AN ORGANIZED HEALTH CARE EDUCATION/TRAINING PROGRAM

## 2024-04-16 PROCEDURE — 85027 COMPLETE CBC AUTOMATED: CPT

## 2024-04-16 PROCEDURE — C1889 IMPLANT/INSERT DEVICE, NOC: HCPCS | Performed by: STUDENT IN AN ORGANIZED HEALTH CARE EDUCATION/TRAINING PROGRAM

## 2024-04-16 PROCEDURE — 3700000001 HC ADD 15 MINUTES (ANESTHESIA): Performed by: STUDENT IN AN ORGANIZED HEALTH CARE EDUCATION/TRAINING PROGRAM

## 2024-04-16 PROCEDURE — C1757 CATH, THROMBECTOMY/EMBOLECT: HCPCS | Performed by: STUDENT IN AN ORGANIZED HEALTH CARE EDUCATION/TRAINING PROGRAM

## 2024-04-16 PROCEDURE — 6360000004 HC RX CONTRAST MEDICATION: Performed by: STUDENT IN AN ORGANIZED HEALTH CARE EDUCATION/TRAINING PROGRAM

## 2024-04-16 PROCEDURE — 3600000017 HC SURGERY HYBRID ADDL 15MIN: Performed by: STUDENT IN AN ORGANIZED HEALTH CARE EDUCATION/TRAINING PROGRAM

## 2024-04-16 PROCEDURE — 2580000003 HC RX 258

## 2024-04-16 PROCEDURE — 041K0JL BYPASS RIGHT FEMORAL ARTERY TO POPLITEAL ARTERY WITH SYNTHETIC SUBSTITUTE, OPEN APPROACH: ICD-10-PCS | Performed by: STUDENT IN AN ORGANIZED HEALTH CARE EDUCATION/TRAINING PROGRAM

## 2024-04-16 PROCEDURE — 2500000003 HC RX 250 WO HCPCS

## 2024-04-16 PROCEDURE — 37214 CESSJ THERAPY CATH REMOVAL: CPT | Performed by: STUDENT IN AN ORGANIZED HEALTH CARE EDUCATION/TRAINING PROGRAM

## 2024-04-16 PROCEDURE — 36415 COLL VENOUS BLD VENIPUNCTURE: CPT

## 2024-04-16 PROCEDURE — B41FYZZ FLUOROSCOPY OF RIGHT LOWER EXTREMITY ARTERIES USING OTHER CONTRAST: ICD-10-PCS | Performed by: STUDENT IN AN ORGANIZED HEALTH CARE EDUCATION/TRAINING PROGRAM

## 2024-04-16 PROCEDURE — 7100000000 HC PACU RECOVERY - FIRST 15 MIN

## 2024-04-16 PROCEDURE — 04WY0JZ REVISION OF SYNTHETIC SUBSTITUTE IN LOWER ARTERY, OPEN APPROACH: ICD-10-PCS | Performed by: STUDENT IN AN ORGANIZED HEALTH CARE EDUCATION/TRAINING PROGRAM

## 2024-04-16 PROCEDURE — 99232 SBSQ HOSP IP/OBS MODERATE 35: CPT | Performed by: STUDENT IN AN ORGANIZED HEALTH CARE EDUCATION/TRAINING PROGRAM

## 2024-04-16 PROCEDURE — 6370000000 HC RX 637 (ALT 250 FOR IP)

## 2024-04-16 PROCEDURE — 6360000002 HC RX W HCPCS

## 2024-04-16 PROCEDURE — C1768 GRAFT, VASCULAR: HCPCS | Performed by: STUDENT IN AN ORGANIZED HEALTH CARE EDUCATION/TRAINING PROGRAM

## 2024-04-16 PROCEDURE — 85055 RETICULATED PLATELET ASSAY: CPT

## 2024-04-16 PROCEDURE — 6370000000 HC RX 637 (ALT 250 FOR IP): Performed by: STUDENT IN AN ORGANIZED HEALTH CARE EDUCATION/TRAINING PROGRAM

## 2024-04-16 PROCEDURE — C1760 CLOSURE DEV, VASC: HCPCS | Performed by: STUDENT IN AN ORGANIZED HEALTH CARE EDUCATION/TRAINING PROGRAM

## 2024-04-16 PROCEDURE — 3700000000 HC ANESTHESIA ATTENDED CARE: Performed by: STUDENT IN AN ORGANIZED HEALTH CARE EDUCATION/TRAINING PROGRAM

## 2024-04-16 PROCEDURE — 71045 X-RAY EXAM CHEST 1 VIEW: CPT

## 2024-04-16 PROCEDURE — 6370000000 HC RX 637 (ALT 250 FOR IP): Performed by: NURSE PRACTITIONER

## 2024-04-16 PROCEDURE — C1894 INTRO/SHEATH, NON-LASER: HCPCS | Performed by: STUDENT IN AN ORGANIZED HEALTH CARE EDUCATION/TRAINING PROGRAM

## 2024-04-16 PROCEDURE — 2000000000 HC ICU R&B

## 2024-04-16 PROCEDURE — 80076 HEPATIC FUNCTION PANEL: CPT

## 2024-04-16 PROCEDURE — 85520 HEPARIN ASSAY: CPT

## 2024-04-16 PROCEDURE — 99223 1ST HOSP IP/OBS HIGH 75: CPT | Performed by: INTERNAL MEDICINE

## 2024-04-16 PROCEDURE — 04CH0ZZ EXTIRPATION OF MATTER FROM RIGHT EXTERNAL ILIAC ARTERY, OPEN APPROACH: ICD-10-PCS | Performed by: STUDENT IN AN ORGANIZED HEALTH CARE EDUCATION/TRAINING PROGRAM

## 2024-04-16 PROCEDURE — 04UK0KZ SUPPLEMENT RIGHT FEMORAL ARTERY WITH NONAUTOLOGOUS TISSUE SUBSTITUTE, OPEN APPROACH: ICD-10-PCS | Performed by: STUDENT IN AN ORGANIZED HEALTH CARE EDUCATION/TRAINING PROGRAM

## 2024-04-16 PROCEDURE — 2580000003 HC RX 258: Performed by: NURSE PRACTITIONER

## 2024-04-16 PROCEDURE — C2623 CATH, TRANSLUMIN, DRUG-COAT: HCPCS | Performed by: STUDENT IN AN ORGANIZED HEALTH CARE EDUCATION/TRAINING PROGRAM

## 2024-04-16 PROCEDURE — 35876 REMOVAL OF CLOT IN GRAFT: CPT | Performed by: STUDENT IN AN ORGANIZED HEALTH CARE EDUCATION/TRAINING PROGRAM

## 2024-04-16 PROCEDURE — 7100000001 HC PACU RECOVERY - ADDTL 15 MIN

## 2024-04-16 PROCEDURE — 6360000002 HC RX W HCPCS: Performed by: NURSE PRACTITIONER

## 2024-04-16 PROCEDURE — 94640 AIRWAY INHALATION TREATMENT: CPT

## 2024-04-16 PROCEDURE — 83036 HEMOGLOBIN GLYCOSYLATED A1C: CPT

## 2024-04-16 PROCEDURE — 2700000000 HC OXYGEN THERAPY PER DAY

## 2024-04-16 PROCEDURE — C1892 INTRO/SHEATH,FIXED,PEEL-AWAY: HCPCS | Performed by: STUDENT IN AN ORGANIZED HEALTH CARE EDUCATION/TRAINING PROGRAM

## 2024-04-16 PROCEDURE — 94664 DEMO&/EVAL PT USE INHALER: CPT

## 2024-04-16 PROCEDURE — 04PYX3Z REMOVAL OF INFUSION DEVICE FROM LOWER ARTERY, EXTERNAL APPROACH: ICD-10-PCS | Performed by: STUDENT IN AN ORGANIZED HEALTH CARE EDUCATION/TRAINING PROGRAM

## 2024-04-16 PROCEDURE — 80048 BASIC METABOLIC PNL TOTAL CA: CPT

## 2024-04-16 PROCEDURE — 04CK0ZZ EXTIRPATION OF MATTER FROM RIGHT FEMORAL ARTERY, OPEN APPROACH: ICD-10-PCS | Performed by: STUDENT IN AN ORGANIZED HEALTH CARE EDUCATION/TRAINING PROGRAM

## 2024-04-16 PROCEDURE — 85384 FIBRINOGEN ACTIVITY: CPT

## 2024-04-16 PROCEDURE — 047K0Z1 DILATION OF RIGHT FEMORAL ARTERY USING DRUG-COATED BALLOON, OPEN APPROACH: ICD-10-PCS | Performed by: STUDENT IN AN ORGANIZED HEALTH CARE EDUCATION/TRAINING PROGRAM

## 2024-04-16 PROCEDURE — P9045 ALBUMIN (HUMAN), 5%, 250 ML: HCPCS

## 2024-04-16 PROCEDURE — 94761 N-INVAS EAR/PLS OXIMETRY MLT: CPT

## 2024-04-16 DEVICE — XENOSURE BIOLOGIC PATCH, 0.8CM X 8CM, EIFU
Type: IMPLANTABLE DEVICE | Site: GROIN | Status: FUNCTIONAL
Brand: XENOSURE BIOLOGIC PATCH

## 2024-04-16 DEVICE — PROPATEN VASCULAR GRAFT TW RR 6MMX50CM 40CM RINGS HEPARIN
Type: IMPLANTABLE DEVICE | Site: ARTERIAL | Status: FUNCTIONAL
Brand: GORE PROPATEN VASCULAR GRAFT

## 2024-04-16 DEVICE — IMPLANTABLE DEVICE: Type: IMPLANTABLE DEVICE | Site: LEG | Status: FUNCTIONAL

## 2024-04-16 DEVICE — CLIP LIG M BLU TI HRT SHP WIRE HORZ 6 CLIPS PER PK: Type: IMPLANTABLE DEVICE | Site: LEG | Status: FUNCTIONAL

## 2024-04-16 RX ORDER — PROPOFOL 10 MG/ML
INJECTION, EMULSION INTRAVENOUS PRN
Status: DISCONTINUED | OUTPATIENT
Start: 2024-04-16 | End: 2024-04-16 | Stop reason: SDUPTHER

## 2024-04-16 RX ORDER — DEXAMETHASONE SODIUM PHOSPHATE 10 MG/ML
INJECTION, SOLUTION INTRAMUSCULAR; INTRAVENOUS PRN
Status: DISCONTINUED | OUTPATIENT
Start: 2024-04-16 | End: 2024-04-16 | Stop reason: SDUPTHER

## 2024-04-16 RX ORDER — ASPIRIN 81 MG/1
81 TABLET ORAL DAILY
Status: DISCONTINUED | OUTPATIENT
Start: 2024-04-16 | End: 2024-04-18 | Stop reason: HOSPADM

## 2024-04-16 RX ORDER — SODIUM CHLORIDE, SODIUM LACTATE, POTASSIUM CHLORIDE, CALCIUM CHLORIDE 600; 310; 30; 20 MG/100ML; MG/100ML; MG/100ML; MG/100ML
INJECTION, SOLUTION INTRAVENOUS CONTINUOUS
Status: DISCONTINUED | OUTPATIENT
Start: 2024-04-17 | End: 2024-04-18

## 2024-04-16 RX ORDER — ATORVASTATIN CALCIUM 80 MG/1
80 TABLET, FILM COATED ORAL DAILY
Status: DISCONTINUED | OUTPATIENT
Start: 2024-04-16 | End: 2024-04-18 | Stop reason: HOSPADM

## 2024-04-16 RX ORDER — SODIUM CHLORIDE 9 MG/ML
INJECTION, SOLUTION INTRAVENOUS CONTINUOUS PRN
Status: DISCONTINUED | OUTPATIENT
Start: 2024-04-16 | End: 2024-04-16

## 2024-04-16 RX ORDER — HEPARIN SODIUM 1000 [USP'U]/ML
INJECTION, SOLUTION INTRAVENOUS; SUBCUTANEOUS PRN
Status: DISCONTINUED | OUTPATIENT
Start: 2024-04-16 | End: 2024-04-16 | Stop reason: HOSPADM

## 2024-04-16 RX ORDER — SODIUM CHLORIDE 0.9 % (FLUSH) 0.9 %
5-40 SYRINGE (ML) INJECTION EVERY 12 HOURS SCHEDULED
Status: DISCONTINUED | OUTPATIENT
Start: 2024-04-16 | End: 2024-04-16

## 2024-04-16 RX ORDER — FENTANYL CITRATE 50 UG/ML
50 INJECTION, SOLUTION INTRAMUSCULAR; INTRAVENOUS
Status: DISCONTINUED | OUTPATIENT
Start: 2024-04-16 | End: 2024-04-16

## 2024-04-16 RX ORDER — OXYCODONE HYDROCHLORIDE 5 MG/1
5 TABLET ORAL EVERY 6 HOURS PRN
Status: DISCONTINUED | OUTPATIENT
Start: 2024-04-16 | End: 2024-04-18 | Stop reason: HOSPADM

## 2024-04-16 RX ORDER — ACETAMINOPHEN 500 MG
1000 TABLET ORAL EVERY 8 HOURS SCHEDULED
Status: DISCONTINUED | OUTPATIENT
Start: 2024-04-16 | End: 2024-04-18 | Stop reason: HOSPADM

## 2024-04-16 RX ORDER — FENTANYL CITRATE 50 UG/ML
25 INJECTION, SOLUTION INTRAMUSCULAR; INTRAVENOUS EVERY 5 MIN PRN
Status: DISCONTINUED | OUTPATIENT
Start: 2024-04-16 | End: 2024-04-16

## 2024-04-16 RX ORDER — IODIXANOL 320 MG/ML
INJECTION, SOLUTION INTRAVASCULAR
Status: DISPENSED
Start: 2024-04-16 | End: 2024-04-17

## 2024-04-16 RX ORDER — METHOCARBAMOL 750 MG/1
750 TABLET, FILM COATED ORAL ONCE
Status: COMPLETED | OUTPATIENT
Start: 2024-04-16 | End: 2024-04-16

## 2024-04-16 RX ORDER — OXYCODONE HYDROCHLORIDE AND ACETAMINOPHEN 5; 325 MG/1; MG/1
2 TABLET ORAL EVERY 4 HOURS PRN
Status: DISCONTINUED | OUTPATIENT
Start: 2024-04-16 | End: 2024-04-16

## 2024-04-16 RX ORDER — GLYCOPYRROLATE 0.2 MG/ML
INJECTION INTRAMUSCULAR; INTRAVENOUS PRN
Status: DISCONTINUED | OUTPATIENT
Start: 2024-04-16 | End: 2024-04-16 | Stop reason: SDUPTHER

## 2024-04-16 RX ORDER — SODIUM CHLORIDE, SODIUM LACTATE, POTASSIUM CHLORIDE, CALCIUM CHLORIDE 600; 310; 30; 20 MG/100ML; MG/100ML; MG/100ML; MG/100ML
INJECTION, SOLUTION INTRAVENOUS CONTINUOUS PRN
Status: DISCONTINUED | OUTPATIENT
Start: 2024-04-16 | End: 2024-04-16 | Stop reason: SDUPTHER

## 2024-04-16 RX ORDER — MIDAZOLAM HYDROCHLORIDE 1 MG/ML
INJECTION INTRAMUSCULAR; INTRAVENOUS PRN
Status: DISCONTINUED | OUTPATIENT
Start: 2024-04-16 | End: 2024-04-16 | Stop reason: SDUPTHER

## 2024-04-16 RX ORDER — LIDOCAINE HYDROCHLORIDE 10 MG/ML
INJECTION, SOLUTION EPIDURAL; INFILTRATION; INTRACAUDAL; PERINEURAL PRN
Status: DISCONTINUED | OUTPATIENT
Start: 2024-04-16 | End: 2024-04-16 | Stop reason: SDUPTHER

## 2024-04-16 RX ORDER — FENTANYL CITRATE 50 UG/ML
INJECTION, SOLUTION INTRAMUSCULAR; INTRAVENOUS PRN
Status: DISCONTINUED | OUTPATIENT
Start: 2024-04-16 | End: 2024-04-16 | Stop reason: SDUPTHER

## 2024-04-16 RX ORDER — SODIUM CHLORIDE 9 MG/ML
INJECTION, SOLUTION INTRAVENOUS PRN
Status: DISCONTINUED | OUTPATIENT
Start: 2024-04-16 | End: 2024-04-16

## 2024-04-16 RX ORDER — NALOXONE HYDROCHLORIDE 0.4 MG/ML
INJECTION, SOLUTION INTRAMUSCULAR; INTRAVENOUS; SUBCUTANEOUS PRN
Status: DISCONTINUED | OUTPATIENT
Start: 2024-04-16 | End: 2024-04-16

## 2024-04-16 RX ORDER — ONDANSETRON 2 MG/ML
INJECTION INTRAMUSCULAR; INTRAVENOUS PRN
Status: DISCONTINUED | OUTPATIENT
Start: 2024-04-16 | End: 2024-04-16 | Stop reason: SDUPTHER

## 2024-04-16 RX ORDER — PHENYLEPHRINE HCL IN 0.9% NACL 1 MG/10 ML
SYRINGE (ML) INTRAVENOUS PRN
Status: DISCONTINUED | OUTPATIENT
Start: 2024-04-16 | End: 2024-04-16 | Stop reason: SDUPTHER

## 2024-04-16 RX ORDER — ALBUTEROL SULFATE 90 UG/1
2 AEROSOL, METERED RESPIRATORY (INHALATION) EVERY 6 HOURS PRN
Status: DISCONTINUED | OUTPATIENT
Start: 2024-04-16 | End: 2024-04-18 | Stop reason: HOSPADM

## 2024-04-16 RX ORDER — CEFEPIME HYDROCHLORIDE 2 G/1
INJECTION, POWDER, FOR SOLUTION INTRAVENOUS PRN
Status: DISCONTINUED | OUTPATIENT
Start: 2024-04-16 | End: 2024-04-16

## 2024-04-16 RX ORDER — IODIXANOL 320 MG/ML
INJECTION, SOLUTION INTRAVASCULAR PRN
Status: DISCONTINUED | OUTPATIENT
Start: 2024-04-16 | End: 2024-04-16 | Stop reason: HOSPADM

## 2024-04-16 RX ORDER — OXYCODONE HYDROCHLORIDE AND ACETAMINOPHEN 5; 325 MG/1; MG/1
1 TABLET ORAL EVERY 4 HOURS PRN
Status: DISCONTINUED | OUTPATIENT
Start: 2024-04-16 | End: 2024-04-16

## 2024-04-16 RX ORDER — FENTANYL CITRATE 50 UG/ML
50 INJECTION, SOLUTION INTRAMUSCULAR; INTRAVENOUS
Status: DISCONTINUED | OUTPATIENT
Start: 2024-04-16 | End: 2024-04-18 | Stop reason: HOSPADM

## 2024-04-16 RX ORDER — GABAPENTIN 100 MG/1
100 CAPSULE ORAL EVERY 8 HOURS
Status: DISCONTINUED | OUTPATIENT
Start: 2024-04-16 | End: 2024-04-18 | Stop reason: HOSPADM

## 2024-04-16 RX ORDER — METHOCARBAMOL 750 MG/1
750 TABLET, FILM COATED ORAL EVERY 6 HOURS
Status: DISCONTINUED | OUTPATIENT
Start: 2024-04-16 | End: 2024-04-18 | Stop reason: HOSPADM

## 2024-04-16 RX ORDER — LABETALOL HYDROCHLORIDE 5 MG/ML
10 INJECTION, SOLUTION INTRAVENOUS EVERY 6 HOURS PRN
Status: DISCONTINUED | OUTPATIENT
Start: 2024-04-16 | End: 2024-04-18 | Stop reason: HOSPADM

## 2024-04-16 RX ORDER — SODIUM CHLORIDE 0.9 % (FLUSH) 0.9 %
5-40 SYRINGE (ML) INJECTION PRN
Status: DISCONTINUED | OUTPATIENT
Start: 2024-04-16 | End: 2024-04-16

## 2024-04-16 RX ORDER — ALBUMIN, HUMAN INJ 5% 5 %
SOLUTION INTRAVENOUS PRN
Status: DISCONTINUED | OUTPATIENT
Start: 2024-04-16 | End: 2024-04-16 | Stop reason: SDUPTHER

## 2024-04-16 RX ORDER — CEFAZOLIN SODIUM 1 G/3ML
INJECTION, POWDER, FOR SOLUTION INTRAMUSCULAR; INTRAVENOUS PRN
Status: DISCONTINUED | OUTPATIENT
Start: 2024-04-16 | End: 2024-04-16 | Stop reason: SDUPTHER

## 2024-04-16 RX ORDER — ROCURONIUM BROMIDE 10 MG/ML
INJECTION, SOLUTION INTRAVENOUS PRN
Status: DISCONTINUED | OUTPATIENT
Start: 2024-04-16 | End: 2024-04-16 | Stop reason: SDUPTHER

## 2024-04-16 RX ORDER — CALCIUM CHLORIDE 100 MG/ML
INJECTION INTRAVENOUS; INTRAVENTRICULAR PRN
Status: DISCONTINUED | OUTPATIENT
Start: 2024-04-16 | End: 2024-04-16 | Stop reason: SDUPTHER

## 2024-04-16 RX ADMIN — FENTANYL CITRATE 25 MCG: 0.05 INJECTION, SOLUTION INTRAMUSCULAR; INTRAVENOUS at 18:40

## 2024-04-16 RX ADMIN — GLYCOPYRROLATE 0.2 MG: 0.2 INJECTION INTRAMUSCULAR; INTRAVENOUS at 16:29

## 2024-04-16 RX ADMIN — ALBUMIN (HUMAN) 12.5 G: 12.5 INJECTION, SOLUTION INTRAVENOUS at 15:36

## 2024-04-16 RX ADMIN — LIDOCAINE HYDROCHLORIDE 50 MG: 10 INJECTION, SOLUTION EPIDURAL; INFILTRATION; INTRACAUDAL; PERINEURAL at 14:57

## 2024-04-16 RX ADMIN — ROCURONIUM BROMIDE 10 MG: 10 INJECTION, SOLUTION INTRAVENOUS at 16:29

## 2024-04-16 RX ADMIN — FENTANYL CITRATE 100 MCG: 0.05 INJECTION, SOLUTION INTRAMUSCULAR; INTRAVENOUS at 14:57

## 2024-04-16 RX ADMIN — SODIUM CHLORIDE, SODIUM LACTATE, POTASSIUM CHLORIDE, CALCIUM CHLORIDE: 600; 310; 30; 20 INJECTION, SOLUTION INTRAVENOUS at 16:16

## 2024-04-16 RX ADMIN — OXYCODONE 5 MG: 5 TABLET ORAL at 23:46

## 2024-04-16 RX ADMIN — CEFAZOLIN 2 G: 1 INJECTION, POWDER, FOR SOLUTION INTRAMUSCULAR; INTRAVENOUS at 15:03

## 2024-04-16 RX ADMIN — HEPARIN SODIUM 2000 UNITS: 1000 INJECTION INTRAVENOUS; SUBCUTANEOUS at 18:51

## 2024-04-16 RX ADMIN — FENTANYL CITRATE 50 MCG: 50 INJECTION INTRAMUSCULAR; INTRAVENOUS at 12:14

## 2024-04-16 RX ADMIN — Medication 200 MCG: at 15:07

## 2024-04-16 RX ADMIN — HEPARIN SODIUM 11000 UNITS: 1000 INJECTION INTRAVENOUS; SUBCUTANEOUS at 16:47

## 2024-04-16 RX ADMIN — CALCIUM CHLORIDE 0.3 G: 100 INJECTION INTRAVENOUS; INTRAVENTRICULAR at 17:23

## 2024-04-16 RX ADMIN — ONDANSETRON 4 MG: 2 INJECTION INTRAMUSCULAR; INTRAVENOUS at 19:52

## 2024-04-16 RX ADMIN — ROCURONIUM BROMIDE 20 MG: 10 INJECTION, SOLUTION INTRAVENOUS at 15:59

## 2024-04-16 RX ADMIN — ROCURONIUM BROMIDE 10 MG: 10 INJECTION, SOLUTION INTRAVENOUS at 17:41

## 2024-04-16 RX ADMIN — CEFAZOLIN 2 G: 1 INJECTION, POWDER, FOR SOLUTION INTRAMUSCULAR; INTRAVENOUS at 19:03

## 2024-04-16 RX ADMIN — IPRATROPIUM BROMIDE AND ALBUTEROL SULFATE 1 DOSE: 2.5; .5 SOLUTION RESPIRATORY (INHALATION) at 12:42

## 2024-04-16 RX ADMIN — PHENYLEPHRINE HYDROCHLORIDE 50 MCG/MIN: 10 INJECTION INTRAVENOUS at 15:10

## 2024-04-16 RX ADMIN — SODIUM CHLORIDE, POTASSIUM CHLORIDE, SODIUM LACTATE AND CALCIUM CHLORIDE: 600; 310; 30; 20 INJECTION, SOLUTION INTRAVENOUS at 19:32

## 2024-04-16 RX ADMIN — FENTANYL CITRATE 25 MCG: 0.05 INJECTION, SOLUTION INTRAMUSCULAR; INTRAVENOUS at 18:28

## 2024-04-16 RX ADMIN — HEPARIN SODIUM 3000 UNITS: 1000 INJECTION INTRAVENOUS; SUBCUTANEOUS at 17:47

## 2024-04-16 RX ADMIN — IPRATROPIUM BROMIDE AND ALBUTEROL SULFATE 1 DOSE: 2.5; .5 SOLUTION RESPIRATORY (INHALATION) at 09:10

## 2024-04-16 RX ADMIN — FENTANYL CITRATE 50 MCG: 50 INJECTION INTRAMUSCULAR; INTRAVENOUS at 01:31

## 2024-04-16 RX ADMIN — ALBUMIN (HUMAN) 12.5 G: 12.5 INJECTION, SOLUTION INTRAVENOUS at 15:19

## 2024-04-16 RX ADMIN — ATORVASTATIN CALCIUM 80 MG: 80 TABLET, FILM COATED ORAL at 09:44

## 2024-04-16 RX ADMIN — SODIUM CHLORIDE, SODIUM LACTATE, POTASSIUM CHLORIDE, CALCIUM CHLORIDE: 600; 310; 30; 20 INJECTION, SOLUTION INTRAVENOUS at 14:48

## 2024-04-16 RX ADMIN — ACETAMINOPHEN 1000 MG: 500 TABLET ORAL at 21:34

## 2024-04-16 RX ADMIN — FENTANYL CITRATE 50 MCG: 0.05 INJECTION, SOLUTION INTRAMUSCULAR; INTRAVENOUS at 19:59

## 2024-04-16 RX ADMIN — IPRATROPIUM BROMIDE AND ALBUTEROL SULFATE 1 DOSE: 2.5; .5 SOLUTION RESPIRATORY (INHALATION) at 21:08

## 2024-04-16 RX ADMIN — OXYCODONE HYDROCHLORIDE AND ACETAMINOPHEN 2 TABLET: 5; 325 TABLET ORAL at 09:46

## 2024-04-16 RX ADMIN — CALCIUM CHLORIDE 0.2 G: 100 INJECTION INTRAVENOUS; INTRAVENTRICULAR at 18:56

## 2024-04-16 RX ADMIN — FENTANYL CITRATE 50 MCG: 50 INJECTION INTRAMUSCULAR; INTRAVENOUS at 06:33

## 2024-04-16 RX ADMIN — GUAIFENESIN 600 MG: 600 TABLET, EXTENDED RELEASE ORAL at 09:44

## 2024-04-16 RX ADMIN — METHOCARBAMOL 750 MG: 750 TABLET ORAL at 21:35

## 2024-04-16 RX ADMIN — FENTANYL CITRATE 50 MCG: 0.05 INJECTION, SOLUTION INTRAMUSCULAR; INTRAVENOUS at 15:59

## 2024-04-16 RX ADMIN — METHOCARBAMOL 750 MG: 750 TABLET ORAL at 02:55

## 2024-04-16 RX ADMIN — ROCURONIUM BROMIDE 10 MG: 10 INJECTION, SOLUTION INTRAVENOUS at 18:17

## 2024-04-16 RX ADMIN — SUGAMMADEX 500 MG: 100 INJECTION, SOLUTION INTRAVENOUS at 20:07

## 2024-04-16 RX ADMIN — SODIUM CHLORIDE, PRESERVATIVE FREE 10 ML: 5 INJECTION INTRAVENOUS at 21:35

## 2024-04-16 RX ADMIN — SODIUM CHLORIDE, SODIUM LACTATE, POTASSIUM CHLORIDE, CALCIUM CHLORIDE: 600; 310; 30; 20 INJECTION, SOLUTION INTRAVENOUS at 18:39

## 2024-04-16 RX ADMIN — OXYCODONE HYDROCHLORIDE AND ACETAMINOPHEN 1 TABLET: 5; 325 TABLET ORAL at 00:24

## 2024-04-16 RX ADMIN — OXYCODONE HYDROCHLORIDE AND ACETAMINOPHEN 2 TABLET: 5; 325 TABLET ORAL at 04:40

## 2024-04-16 RX ADMIN — PROPOFOL 150 MG: 10 INJECTION, EMULSION INTRAVENOUS at 14:57

## 2024-04-16 RX ADMIN — ROCURONIUM BROMIDE 50 MG: 10 INJECTION, SOLUTION INTRAVENOUS at 14:57

## 2024-04-16 RX ADMIN — GUAIFENESIN 600 MG: 600 TABLET, EXTENDED RELEASE ORAL at 23:38

## 2024-04-16 RX ADMIN — MIDAZOLAM 2 MG: 1 INJECTION INTRAMUSCULAR; INTRAVENOUS at 14:57

## 2024-04-16 RX ADMIN — Medication 10 MG: at 22:15

## 2024-04-16 RX ADMIN — SODIUM CHLORIDE, PRESERVATIVE FREE 10 ML: 5 INJECTION INTRAVENOUS at 09:46

## 2024-04-16 RX ADMIN — CALCIUM CHLORIDE 0.5 G: 100 INJECTION INTRAVENOUS; INTRAVENTRICULAR at 15:40

## 2024-04-16 RX ADMIN — ROCURONIUM BROMIDE 10 MG: 10 INJECTION, SOLUTION INTRAVENOUS at 17:19

## 2024-04-16 RX ADMIN — ROCURONIUM BROMIDE 20 MG: 10 INJECTION, SOLUTION INTRAVENOUS at 15:21

## 2024-04-16 RX ADMIN — ROCURONIUM BROMIDE 10 MG: 10 INJECTION, SOLUTION INTRAVENOUS at 19:35

## 2024-04-16 RX ADMIN — SODIUM CHLORIDE, POTASSIUM CHLORIDE, SODIUM LACTATE AND CALCIUM CHLORIDE: 600; 310; 30; 20 INJECTION, SOLUTION INTRAVENOUS at 15:01

## 2024-04-16 RX ADMIN — ROCURONIUM BROMIDE 10 MG: 10 INJECTION, SOLUTION INTRAVENOUS at 16:53

## 2024-04-16 RX ADMIN — ROCURONIUM BROMIDE 10 MG: 10 INJECTION, SOLUTION INTRAVENOUS at 18:50

## 2024-04-16 RX ADMIN — ASPIRIN 81 MG: 81 TABLET, COATED ORAL at 09:44

## 2024-04-16 RX ADMIN — GABAPENTIN 100 MG: 100 CAPSULE ORAL at 21:33

## 2024-04-16 RX ADMIN — DEXAMETHASONE SODIUM PHOSPHATE 10 MG: 10 INJECTION, SOLUTION INTRAMUSCULAR; INTRAVENOUS at 15:03

## 2024-04-16 ASSESSMENT — PAIN DESCRIPTION - LOCATION
LOCATION: FOOT
LOCATION: FOOT
LOCATION: LEG
LOCATION: FOOT
LOCATION: LEG
LOCATION: LEG
LOCATION: FOOT
LOCATION: LEG
LOCATION: LEG

## 2024-04-16 ASSESSMENT — PAIN DESCRIPTION - ORIENTATION
ORIENTATION: RIGHT

## 2024-04-16 ASSESSMENT — PAIN SCALES - GENERAL
PAINLEVEL_OUTOF10: 7
PAINLEVEL_OUTOF10: 3
PAINLEVEL_OUTOF10: 8
PAINLEVEL_OUTOF10: 2
PAINLEVEL_OUTOF10: 8
PAINLEVEL_OUTOF10: 3
PAINLEVEL_OUTOF10: 4
PAINLEVEL_OUTOF10: 8
PAINLEVEL_OUTOF10: 6
PAINLEVEL_OUTOF10: 9
PAINLEVEL_OUTOF10: 8
PAINLEVEL_OUTOF10: 9

## 2024-04-16 ASSESSMENT — ENCOUNTER SYMPTOMS: GASTROINTESTINAL NEGATIVE: 1

## 2024-04-16 ASSESSMENT — LIFESTYLE VARIABLES: SMOKING_STATUS: 1

## 2024-04-16 NOTE — OP NOTE
Operative Note      Patient: Crispin Marcum  YOB: 1960  MRN: [id number]    Date of Procedure: 4/15/2024    Pre-Op Diagnosis: Acute limb ischemia of right leg    Post-Op Diagnosis: Same       Procedures:  1) Ultrasound guided access of left common femoral artery  2) Angiogram of aorta, bilateral iliac arteries with catheter in aorta  3) Right leg angiogram with catheter in right common femoral artery and profunda  4) Placement of right leg arterial lysis catheter from origin of right external iliac artery to profunda artery (Nhan 20 cm treatment length)    Surgeon(s):  Kiarra Marin MD    Assistant: None    Anesthesia: Monitor Anesthesia Care    Estimated Blood Loss (mL): Minimal    Complications: None    Specimens: None    Implants: None      Drains: None    Findings:  Infection Present At Time Of Surgery (PATOS) (choose all levels that have infection present):  No infection present  Other Findings: Aorta is patent and it is aneurysmal. Bilateral common iliac arteries are patent. Left external iliac artery is patent. Right external iliac artery is not patent.  The right hypogastric artery is supplying branches distally.  The right common femoral, profunda and and common femoral artery to above-knee popliteal artery bypass are all occluded.  There is no flow seen distal to the groin. Selective profunda imaging showed some distal branches on angiogram images. Lysis catheter was placed from right external iliac artery to profunda artery.    Detailed Description of Procedure:   Mr. Marcum was brought to the hybrid room placed supine position.  His airway and sedation were managed by the anesthesia team.  Bilateral groins were prepped and draped in standard sterile fashion.  A timeout was performed.  I evaluated his left Comperm artery with ultrasound it was patent and compressible.  I anesthetized the overlying skin and soft tissue with local anesthetic.  Under direct ultrasound guidance

## 2024-04-16 NOTE — PROGRESS NOTES
Physical Therapy        Physical Therapy Cancel Note      DATE: 2024    NAME: Crispin Marcum  MRN: [id number]   : 1960      Patient not seen this date for Physical Therapy due to:    Surgery/Procedure: OR today for RLE arteriogram, removal of lysis catheter, possible angioplasty/stenting/thrombectomy/fasciotomy. Strict bedrest in place.       Electronically signed by Nathan Seth PT on 2024 at 8:10 AM

## 2024-04-16 NOTE — PROGRESS NOTES
04/15/24  1032 04/15/24  1256 04/15/24  1824 04/15/24  2258 04/16/24  0633   APTT 30.1   < > >180.0* >180.0* 31.7   INR 1.0  --  1.0  --   --     < > = values in this interval not displayed.         Radiology Review:    XR CHEST PORTABLE    Result Date: 4/15/2024  EXAMINATION: ONE XRAY VIEW OF THE CHEST 4/15/2024 7:27 pm COMPARISON: None. HISTORY: COPD exacerbation FINDINGS: The cardiomediastinal and hilar silhouettes appear unremarkable.  The lungs appear clear. No pleural effusion evident. No pneumothorax is seen. No acute osseous abnormality is identified.     No radiographic evidence of acute cardiopulmonary disease.     CTA ABDOMINAL AORTA W BILAT RUNOFF W CONTRAST    Result Date: 4/15/2024  EXAMINATION: CTA OF THE AORTA WITH LOWER EXTREMITY RUNOFF 4/15/2024 11:14 am TECHNIQUE: CTA of the pelvis and bilateral lower extremities was performed after the administration of intravenous contrast.   Multiplanar reformatted images are provided for review.  MIP images are provided for review. Automated exposure control, iterative reconstruction, and/or weight based adjustment of the mA/kV was utilized to reduce the radiation dose to as low as reasonably achievable. COMPARISON: March 2, 2022 HISTORY: ORDERING SYSTEM PROVIDED HISTORY: right foot numbness; purple; cold to touch and pain x 3 days; hx of arterial graft right leg 5 years ago TECHNOLOGIST PROVIDED HISTORY: right foot numbness; purple; cold to touch and pain x 3 days; hx of arterial graft right leg 5 years ago Additional Contrast?->1 FINDINGS: Nonvascular Lower Chest: Mild atelectasis or scarring in the right lower lobe.  Probable scarring involving the anterior aspect of the right middle lobe.  Image cardiac chambers are unremarkable in appearance.  No pericardial effusion or pericardial thickening. Organs: Small cysts or hemangiomas in the liver.  Diffuse fatty infiltration liver.  No calcified gallstones are seen.  Spleen is normal in appearance.  5 mm  thrombosed.  Similar findings seen involving the posterior tibial artery. Peroneal artery opacifies with contrast to the level of the distal leg. Moderate atherosclerotic plaque involving the left external iliac artery which results in maximum of 80% stenosis.  Left external iliac artery plaque results in a maximum of approximately 30% stenosis.  Left common femoral arteries patent.  Profundus femoris artery is patent.  Left superficial femoral artery and left popliteal artery are patent.  Portion of the anterior tibial artery is patent.  Aberrant location of the anterior tibial artery versus collateral opacified vessel in the anterior leg.  Non opacification of the proximal posterior tibial artery.  Opacification of the distal posterior tibial artery.  Peroneal artery is patent to the ankle.  Multiple collateral vessels in the leg which results in opacification of the distal posterior tibial artery.  Multiple enhancing collateral vessels are seen in the ankle and foot.     Occlusion of the right external iliac artery, right common femoral artery, profundus femoris artery, and right superficial femoral artery.  Collateral blood flow from the right gluteal arteries results in opacification of the right popliteal artery.  Occlusion of the right anterior tibial and posterior tibial arteries is also noted with persistent opacification of the distal right peroneal artery. Probable occlusion of the left anterior tibial artery and proximal right posterior tibial artery.  Multiple collateral vessels are seen within the left leg, ankle, and foot.  Aberrant left anterior tibial artery versus collateral vessel anterior to the peroneal artery. Dilatation of the infrarenal abdominal aorta which measures a maximum 4.2 cm. Additional findings of vascular disease noted above. Hepatic steatosis. Diverticulosis evidence of diverticulitis. Ulceration involving the lateral aspect of the right foot without definite associated

## 2024-04-16 NOTE — PROGRESS NOTES
Occupational Therapy    [facility]  Occupational Therapy Not Seen Note    DATE: 2024    NAME: Crispin Marcum  MRN: [id number]   : 1960      Patient not seen this date for Occupational Therapy due to:    Surgery/Procedure: OR with vascular today for \"RLE arteriogram, removal of lysis catheter, possible angioplasty/stenting/thrombectomy/fasciotomy.\" Additionally, strict bedrest order is in place. OT will evaluate once appropriate    Next Scheduled Treatment: 2024    Electronically signed by RUTH ANN Castellon on 2024 at 11:14 AM

## 2024-04-16 NOTE — ANESTHESIA PRE PROCEDURE
Department of Anesthesiology  Preprocedure Note       Name:  Crispin Marcum   Age:  63 y.o.  :  1960                                          MRN:  [id number]         Date:  2024      Surgeon: Surgeon(s):  Kiarra Marin MD    Procedure: Procedure(s):  LYSIS CHECK POSSIBLE OPEN THROMBECTOMY    Medications prior to admission:   Prior to Admission medications    Medication Sig Start Date End Date Taking? Authorizing Provider   Apoaequorin (PREVAGEN PO) Take by mouth daily    Karthikeyan Nettles MD   Misc Natural Products (OSTEO BI-FLEX ADV DOUBLE ST PO) Take by mouth daily    Karthikeyan Nettles MD   albuterol sulfate HFA (PROVENTIL;VENTOLIN;PROAIR) 108 (90 Base) MCG/ACT inhaler Inhale 2 puffs into the lungs every 6 hours as needed for Wheezing    Karthikeyan Nettles MD   atorvastatin (LIPITOR) 20 MG tablet Take 1 tablet by mouth daily    Karthikeyan Nettles MD       Current medications:    Current Facility-Administered Medications   Medication Dose Route Frequency Provider Last Rate Last Admin    fentaNYL (SUBLIMAZE) injection 50 mcg  50 mcg IntraVENous Q2H PRN Waterhouse, Shirley Ann, APRN - CNP        oxyCODONE-acetaminophen (PERCOCET) 5-325 MG per tablet 1 tablet  1 tablet Oral Q4H PRN Waterhouse, Shirley Ann, APRN - CNP        Or    oxyCODONE-acetaminophen (PERCOCET) 5-325 MG per tablet 2 tablet  2 tablet Oral Q4H PRN Waterhouse, Shirley Ann, APRN - CNP        heparin (porcine) injection 9,000 Units  80 Units/kg IntraVENous PRN Truong Best MD        heparin (porcine) injection 4,500 Units  40 Units/kg IntraVENous PRN Truong Best MD        [Held by provider] heparin 25,000 units in dextrose 5% 250 mL (premix) infusion  5-30 Units/kg/hr IntraVENous Continuous Truong Best MD   Stopped at 04/15/24 2051    naloxone 0.4 mg in 10 mL sodium chloride syringe   IntraVENous PRN Dusty Ocasio MD        sodium chloride flush 0.9 % injection 5-40 mL  5-40 mL IntraVENous 2 times

## 2024-04-16 NOTE — H&P
Division of Vascular Surgery        New Consult      Physician Requesting Consult:  Dr. Meraz    Reason for Consult:   txf from [facility], R tibial artery occlusion, RLE ischemia    Chief Complaint:      Right leg pain    History of Present Illness:      Crispin Marcum is a 63 y.o. gentleman who presents with right foot pain and numbness that began 3 months ago, worsening pain 3 days ago. Pt has history of right lower extremity arterial bypass done in 1990s. He had bilateral metatarsal amputation for frostbite several years ago. Pt is usually able to ambulate but mobility has been limited over the last three days. Is not on anticoagulation, but does take a statin every day. Is a former smoker.     CT abdomen pelvis was obtained at [facility] which showed occlusion of the right external iliac artery extending down to the popliteal artery, with constitution from the gluteal arteries, along with probable occlusion of left anterior tibial artery. Heparin gtt was started and he was transferred to [facility] for vascular surgery evaluation.     Medical History:     Past Medical History:   Diagnosis Date    Autonomic nervous system disorder     Frostbite     History of femoral angiogram        Surgical History:     Past Surgical History:   Procedure Laterality Date    FEMORAL BYPASS  11/29/2016    FOOT AMPUTATION THROUGH METATARSAL      times two    SYMPATHECTOMY         Family History:     Family History   Problem Relation Age of Onset    Cancer Mother        Allergies:       Patient has no known allergies.    Medications:      Current Facility-Administered Medications   Medication Dose Route Frequency Provider Last Rate Last Admin    heparin (porcine) injection 9,000 Units  80 Units/kg IntraVENous PRN Truong Best MD        heparin (porcine) injection 4,500 Units  40 Units/kg IntraVENous PRN Truong Best MD        heparin 25,000 units in dextrose 5% 250 mL (premix) infusion  5-30 Units/kg/hr

## 2024-04-16 NOTE — PROGRESS NOTES
04/16/24 0938   RT Protocol   History Pulmonary Disease 2   Respiratory pattern 4   Breath sounds 6   Cough 0   Indications for Bronchodilator Therapy Wheezing associated with pulm disorder   Bronchodilator Assessment Score 12

## 2024-04-16 NOTE — PLAN OF CARE
Problem: Discharge Planning  Goal: Discharge to home or other facility with appropriate resources  4/16/2024 0758 by Marcellus Mabry, RN  Outcome: Progressing  4/15/2024 2331 by Gabi Lancaster RN  Outcome: Progressing     Problem: Pain  Goal: Verbalizes/displays adequate comfort level or baseline comfort level  4/16/2024 0758 by Marcellus Mabry, RN  Outcome: Progressing  4/15/2024 2331 by Gabi Lancaster RN  Outcome: Progressing     Problem: Safety - Adult  Goal: Free from fall injury  Outcome: Progressing     Problem: Skin/Tissue Integrity  Goal: Absence of new skin breakdown  Description: 1.  Monitor for areas of redness and/or skin breakdown  2.  Assess vascular access sites hourly  3.  Every 4-6 hours minimum:  Change oxygen saturation probe site  4.  Every 4-6 hours:  If on nasal continuous positive airway pressure, respiratory therapy assess nares and determine need for appliance change or resting period.  Outcome: Progressing

## 2024-04-16 NOTE — PROGRESS NOTES
Postoperative Progress Note    - Pt seen and examined s/p RLE arteriogram with thrombolysis catheter placement. Continues to have motor and sensation intact of the right lower extremity.     Pulse exam: R popliteal signal, no DP/PT signal; palpable L popliteal pulse, L DP/PT signals    - Heparin and tPA through thrombolysis catheter. Will monitor CBC, aPTT, and fibrinogen q6 hrs.     He is tolerating laying flat. Will be NPO starting now for repeat arteriogram with catheter removal later on 4/16.     Internal Medicine team to assume primary postoperatively. We will continue to follow.     Aubree Ventura DO  General Surgery PGY-1  4/16/24 12:00AM

## 2024-04-16 NOTE — CARE COORDINATION
Case Management Assessment  Initial Evaluation    Date/Time of Evaluation: 4/16/2024 7:50 AM  Assessment Completed by: WALTER PAEZ RN    If patient is discharged prior to next notation, then this note serves as note for discharge by case management.    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 (P) Yes    Chart Reviewed: Yes      History Provided by: (P) Patient  Patient Orientation: (P) Alert and Oriented    Patient Cognition: (P) Alert    Hospitalization in the last 30 days (Readmission):  No    If yes, Readmission Assessment in  Navigator will be completed.    Advance Directives:      Code Status: Full Code   Patient's Primary Decision Maker is: (P) Legal Next of Kin      Discharge Planning:    Patient lives with: (P) Spouse/Significant Other Type of Home: (P) House  Primary Care Giver: (P) Self  Patient Support Systems include: (P) Spouse/Significant Other   Current Financial resources:    Current community resources:    Current services prior to admission: (P) Durable Medical Equipment            Current DME: (P) Shower Chair, Walker, Cane            Type of Home Care services:  (P) None    ADLS  Prior functional level: (P) Assistance with the following:, Dressing, Housework, Shopping  Current functional level: (P) Other (see comment) (Unknown at present)    PT AM-PAC:   /24  OT AM-PAC:   /24    Family can provide assistance at DC: (P) Yes  Would you like Case Management to discuss the discharge plan with any other family

## 2024-04-16 NOTE — PROGRESS NOTES
[facility]  Office: [phone]  Naveed Barrios DO, Ward Carballo, DO, Hal Redman DO, Dalton Ruiz, DO, Mariusz Franklin MD, Roseanna Sorenson MD, Danette De La Garza MD, Annabel Marshall MD,  Mic Daily MD, Kaushik Marin MD, Noe Abbott MD,  Saumya Whitehead DO, Farshad Barraza MD, Lavon Randolph MD, Albert Barrios DO, Alma Delia Mc MD,  Indio Vidal DO, Le Robbins MD, Khloe Britt MD, Tasha Batista MD, Kassidy Brown MD,  Gurjit Larry MD, Jennifer Herring MD, Elio Spaulding MD, Jackie Collins MD, Edgar Matias MD, Peng Torres MD, Babak Russell DO, Phong De DO, Francesco Peña MD,  Alfredo Mcdaniel MD, Shirley Waterhouse, CNP,  Monica Rob CNP, Arvin Garcia, CNP,  Kia Vides, DNP, Risa Briones, CNP, Richelle Chandler, CNP, Anca Suarez, CNP, Jasmina Ayala, CNP, Jaqueline Anderson, PA-C, Pattie Jin PA-C, Ebonie Ying, CNP, Hayde Laurent, CNP, Ginette Casillas, CNP, Katie Nieto, CNP, Isabel Martínez, CNP, Jaci Saavedra, CNS, Lisa Downey, CNP, Paige Holden CNP, Tracy Schwab, CNP         [facility]   IN-PATIENT SERVICE   [facility]    Progress Note    4/16/2024    12:29 PM    Name:   Crispin Marcum  MRN:     [id number]     Acct:      [id number]   Room:   1021/1021-01   Day:  1  Admit Date:  4/15/2024  5:58 PM    PCP:   Gio Rodriguez DO  Code Status:  Full Code    Subjective:     C/C:   Chief Complaint   Patient presents with    Circulatory Problem     Interval History Status: not changed.     Seen and examined at time of my evaluation continues to report severe pain in her right lower extremities, denies any other complaint  Vascular team is following appreciate input, on heparin, aspirin and statin  Continue with pain management, plan for R arteriogram with possible angioplasty/stenting  Review of Systems:     Review of Systems   Constitutional: Negative.    Cardiovascular: Negative.    Gastrointestinal: Negative.   hours.    I/O (24Hr):    Intake/Output Summary (Last 24 hours) at 4/16/2024 1229  Last data filed at 4/16/2024 0130  Gross per 24 hour   Intake --   Output 450 ml   Net -450 ml       Labs:  Hematology:  Recent Labs     04/15/24  1032 04/15/24  1824 04/15/24  2258 04/16/24  0633   WBC 12.3* 11.7* 13.6* 11.6*   RBC 5.69 5.78* 5.36 5.07   HGB 16.8 16.9 16.0 14.9   HCT 48.7 50.9* 46.9 46.3   MCV 85.6 88.1 87.5 91.3   MCH 29.5 29.2 29.9 29.4   MCHC 34.5 33.2 34.1 32.2   RDW 13.8 14.3 14.3 14.4    178 146 149   MPV 10.4 10.5 10.9 10.6   INR 1.0 1.0  --   --      Chemistry:  Recent Labs     04/15/24  1032 04/15/24  1824 04/16/24  0633     --  140   K 4.1  --  4.5     --  107   CO2 24  --  21   GLUCOSE 97  --  108*   BUN 15  --  16   CREATININE 0.8  --  0.9   ANIONGAP 11  --  12   LABGLOM >90  --  >90   CALCIUM 9.3  --  8.7   PROBNP  --  72  --    TROPHS  --  21  --      Recent Labs     04/16/24  0633   PROT 6.4*   AST 64*   ALT 24   ALKPHOS 80   BILITOT 0.4   BILIDIR <0.2     ABG:No results found for: \"POCPH\", \"PHART\", \"PH\", \"POCPCO2\", \"HMJ3BEP\", \"PCO2\", \"POCPO2\", \"PO2ART\", \"PO2\", \"POCHCO3\", \"FND3NHE\", \"HCO3\", \"NBEA\", \"PBEA\", \"BEART\", \"BE\", \"THGBART\", \"THB\", \"MZW2IIO\", \"SXMN1GIX\", \"T8KUOBGP\", \"O2SAT\", \"FIO2\"  Lab Results   Component Value Date/Time    SPECIAL NOT REPORTED 01/24/2022 03:54 PM     Lab Results   Component Value Date/Time    CULTURE NO GROWTH 01/24/2022 03:54 PM       Radiology:  XR CHEST PORTABLE    Result Date: 4/16/2024  No acute cardiopulmonary process.     XR CHEST PORTABLE    Result Date: 4/15/2024  No radiographic evidence of acute cardiopulmonary disease.     CTA ABDOMINAL AORTA W BILAT RUNOFF W CONTRAST    Result Date: 4/15/2024  Occlusion of the right external iliac artery, right common femoral artery, profundus femoris artery, and right superficial femoral artery.  Collateral blood flow from the right gluteal arteries results in opacification of the right popliteal artery.

## 2024-04-16 NOTE — H&P
[facility]  Office: [phone]  Naveed Barrios DO, Ward Carballo DO, Hal Redman DO, Dalton Ruiz DO, Mariusz Franklin MD, Roseanna Sorenson MD, Danetet De La Garza MD, Annabel Marshall MD,  Mic Daily MD, Kaushik Marin MD, Noe Abbott MD,  Saumya Whitehead DO, Farshad Barraza MD, Lavon Randolph MD, Albert Barrios DO, Alma Delia Mc MD,  Indio Vidal DO, Le Robbins MD, Khloe Britt MD, Tasha Batista MD, Kassidy Brown MD,  Gurjit Larry MD, Jennifer Herring MD, Elio Spaulding MD, Jackie Collins MD, Edgar Matias MD, Peng Torres MD, Babak Russell DO, Phong De DO, Francesco Peña MD,  Alfredo Mcdaniel MD, Shirley Waterhouse, CNP,  Monica Rob CNP, Arvin Garcia, CNP,  Kia Vides, DNP, Risa Briones, CNP, Richelle Chandler, CNP, Anca Suarez CNP, Jasmina Ayala, CNP, Jaqueline Anderson, PA-C, Pattie Jin PA-C, Ebonie Ying, CNP, Hayde Laurent, CNP, Ginette Casillas, CNP, Katie Nieto, CNP, Isabel Martínez, CNP, Jaci Saavedra, CNS, Lisa Downey, CNP, Paige Holden CNP, Tracy Schwab, CNP         [facility]   IN-PATIENT SERVICE   [facility]    HISTORY AND PHYSICAL EXAMINATION            Date:   4/15/2024  Patient name:  Crispin Marcum  Date of admission:  4/15/2024  5:58 PM  MRN:   [id number]  Account:  [id number]  YOB: 1960  PCP:    Gio Rodriguez DO  Room:   [address] [place] [address]  Code Status:    Full Code    Chief Complaint:     Chief Complaint   Patient presents with    Circulatory Problem   \" Friday I could not get comfortable. .  I Knew something was wrong\"    History Obtained From:     patient    History of Present Illness:     Crispin Marcum is a 63 y.o.  male with tobacco abuse ongoing, prior frostbite injury to feet bilaterally in 1986 with complicated wounds ultimately requiring bilateral foot amputation 1991, PAD status post femoral-popliteal bypass 11/2016 presents with Circulatory Problem   and is admitted to the hospital

## 2024-04-16 NOTE — ANESTHESIA POSTPROCEDURE EVALUATION
Department of Anesthesiology  Postprocedure Note    Patient: Crispin Marcum  MRN: [id number]  YOB: 1960  Date of evaluation: 4/16/2024    Procedure Summary       Date: 04/15/24 Room / Location: [place] [address] / [facility]    Anesthesia Start: 2040 Anesthesia Stop: 2202    Procedure: RIGHT LEG ANGIOGRAM, LYSIS CATHETER PLACEMENT (Right: Groin) Diagnosis:       Ischemia      (Ischemia [I99.8])    Surgeons: Kiarra Marin MD Responsible Provider: Dusty Ocasio MD    Anesthesia Type: MAC ASA Status: 3 - Emergent            Anesthesia Type: No value filed.    Brandy Phase I:      Brandy Phase II:      Anesthesia Post Evaluation    Patient location during evaluation: bedside  Patient participation: complete - patient participated  Level of consciousness: awake  Airway patency: patent  Nausea & Vomiting: no nausea and no vomiting  Cardiovascular status: blood pressure returned to baseline  Respiratory status: acceptable  Hydration status: euvolemic  Comments: /77   Pulse 89   Temp 98.2 °F (36.8 °C) (Oral)   Resp 16   Wt 115.6 kg (254 lb 13.6 oz)   SpO2 93%   BMI 34.56 kg/m²     Pain management: adequate    No notable events documented.

## 2024-04-17 LAB
ANION GAP SERPL CALCULATED.3IONS-SCNC: 18 MMOL/L (ref 9–16)
ANTI-XA UNFRAC HEPARIN: 0.59 IU/L
ANTI-XA UNFRAC HEPARIN: 0.59 IU/L
ANTI-XA UNFRAC HEPARIN: <0.1 IU/L
BUN SERPL-MCNC: 17 MG/DL (ref 8–23)
CALCIUM SERPL-MCNC: 8.5 MG/DL (ref 8.6–10.4)
CHLORIDE SERPL-SCNC: 105 MMOL/L (ref 98–107)
CO2 SERPL-SCNC: 17 MMOL/L (ref 20–31)
CREAT SERPL-MCNC: 0.8 MG/DL (ref 0.7–1.2)
ERYTHROCYTE [DISTWIDTH] IN BLOOD BY AUTOMATED COUNT: 13.9 % (ref 11.8–14.4)
GFR SERPL CREATININE-BSD FRML MDRD: >90 ML/MIN/1.73M2
GLUCOSE SERPL-MCNC: 156 MG/DL (ref 74–99)
HCT VFR BLD AUTO: 41.1 % (ref 40.7–50.3)
HGB BLD-MCNC: 13 G/DL (ref 13–17)
MCH RBC QN AUTO: 29.3 PG (ref 25.2–33.5)
MCHC RBC AUTO-ENTMCNC: 31.6 G/DL (ref 28.4–34.8)
MCV RBC AUTO: 92.6 FL (ref 82.6–102.9)
NRBC BLD-RTO: 0 PER 100 WBC
PLATELET # BLD AUTO: 128 K/UL (ref 138–453)
PMV BLD AUTO: 10.4 FL (ref 8.1–13.5)
POTASSIUM SERPL-SCNC: 4.7 MMOL/L (ref 3.7–5.3)
RBC # BLD AUTO: 4.44 M/UL (ref 4.21–5.77)
SODIUM SERPL-SCNC: 140 MMOL/L (ref 136–145)
WBC OTHER # BLD: 12.9 K/UL (ref 3.5–11.3)

## 2024-04-17 PROCEDURE — 2000000000 HC ICU R&B

## 2024-04-17 PROCEDURE — 2580000003 HC RX 258

## 2024-04-17 PROCEDURE — 2700000000 HC OXYGEN THERAPY PER DAY

## 2024-04-17 PROCEDURE — 94761 N-INVAS EAR/PLS OXIMETRY MLT: CPT

## 2024-04-17 PROCEDURE — 97530 THERAPEUTIC ACTIVITIES: CPT

## 2024-04-17 PROCEDURE — 6360000002 HC RX W HCPCS

## 2024-04-17 PROCEDURE — 97162 PT EVAL MOD COMPLEX 30 MIN: CPT

## 2024-04-17 PROCEDURE — 99231 SBSQ HOSP IP/OBS SF/LOW 25: CPT | Performed by: STUDENT IN AN ORGANIZED HEALTH CARE EDUCATION/TRAINING PROGRAM

## 2024-04-17 PROCEDURE — 85520 HEPARIN ASSAY: CPT

## 2024-04-17 PROCEDURE — 85027 COMPLETE CBC AUTOMATED: CPT

## 2024-04-17 PROCEDURE — 2580000003 HC RX 258: Performed by: STUDENT IN AN ORGANIZED HEALTH CARE EDUCATION/TRAINING PROGRAM

## 2024-04-17 PROCEDURE — 97166 OT EVAL MOD COMPLEX 45 MIN: CPT

## 2024-04-17 PROCEDURE — 6370000000 HC RX 637 (ALT 250 FOR IP): Performed by: STUDENT IN AN ORGANIZED HEALTH CARE EDUCATION/TRAINING PROGRAM

## 2024-04-17 PROCEDURE — 80048 BASIC METABOLIC PNL TOTAL CA: CPT

## 2024-04-17 PROCEDURE — 6370000000 HC RX 637 (ALT 250 FOR IP)

## 2024-04-17 PROCEDURE — 97535 SELF CARE MNGMENT TRAINING: CPT

## 2024-04-17 PROCEDURE — 36415 COLL VENOUS BLD VENIPUNCTURE: CPT

## 2024-04-17 PROCEDURE — 94640 AIRWAY INHALATION TREATMENT: CPT

## 2024-04-17 PROCEDURE — 6360000002 HC RX W HCPCS: Performed by: STUDENT IN AN ORGANIZED HEALTH CARE EDUCATION/TRAINING PROGRAM

## 2024-04-17 RX ADMIN — FENTANYL CITRATE 50 MCG: 50 INJECTION INTRAMUSCULAR; INTRAVENOUS at 18:15

## 2024-04-17 RX ADMIN — METHOCARBAMOL 750 MG: 750 TABLET ORAL at 03:15

## 2024-04-17 RX ADMIN — SODIUM CHLORIDE, PRESERVATIVE FREE 10 ML: 5 INJECTION INTRAVENOUS at 20:19

## 2024-04-17 RX ADMIN — ACETAMINOPHEN 1000 MG: 500 TABLET ORAL at 13:49

## 2024-04-17 RX ADMIN — IPRATROPIUM BROMIDE AND ALBUTEROL SULFATE 1 DOSE: 2.5; .5 SOLUTION RESPIRATORY (INHALATION) at 08:14

## 2024-04-17 RX ADMIN — OXYCODONE 5 MG: 5 TABLET ORAL at 15:56

## 2024-04-17 RX ADMIN — FENTANYL CITRATE 50 MCG: 50 INJECTION INTRAMUSCULAR; INTRAVENOUS at 11:01

## 2024-04-17 RX ADMIN — METHOCARBAMOL 750 MG: 750 TABLET ORAL at 20:18

## 2024-04-17 RX ADMIN — IPRATROPIUM BROMIDE AND ALBUTEROL SULFATE 1 DOSE: 2.5; .5 SOLUTION RESPIRATORY (INHALATION) at 11:23

## 2024-04-17 RX ADMIN — IPRATROPIUM BROMIDE AND ALBUTEROL SULFATE 1 DOSE: 2.5; .5 SOLUTION RESPIRATORY (INHALATION) at 15:47

## 2024-04-17 RX ADMIN — GUAIFENESIN 600 MG: 600 TABLET, EXTENDED RELEASE ORAL at 20:19

## 2024-04-17 RX ADMIN — ATORVASTATIN CALCIUM 80 MG: 80 TABLET, FILM COATED ORAL at 08:34

## 2024-04-17 RX ADMIN — OXYCODONE 5 MG: 5 TABLET ORAL at 23:39

## 2024-04-17 RX ADMIN — SODIUM CHLORIDE, PRESERVATIVE FREE 10 ML: 5 INJECTION INTRAVENOUS at 08:34

## 2024-04-17 RX ADMIN — GUAIFENESIN 600 MG: 600 TABLET, EXTENDED RELEASE ORAL at 08:34

## 2024-04-17 RX ADMIN — SODIUM CHLORIDE, POTASSIUM CHLORIDE, SODIUM LACTATE AND CALCIUM CHLORIDE: 600; 310; 30; 20 INJECTION, SOLUTION INTRAVENOUS at 00:00

## 2024-04-17 RX ADMIN — HEPARIN SODIUM 18 UNITS/KG/HR: 10000 INJECTION, SOLUTION INTRAVENOUS at 03:10

## 2024-04-17 RX ADMIN — GABAPENTIN 100 MG: 100 CAPSULE ORAL at 13:49

## 2024-04-17 RX ADMIN — HEPARIN SODIUM 18 UNITS/KG/HR: 10000 INJECTION, SOLUTION INTRAVENOUS at 20:46

## 2024-04-17 RX ADMIN — ACETAMINOPHEN 1000 MG: 500 TABLET ORAL at 20:45

## 2024-04-17 RX ADMIN — FENTANYL CITRATE 50 MCG: 50 INJECTION INTRAMUSCULAR; INTRAVENOUS at 04:28

## 2024-04-17 RX ADMIN — HEPARIN SODIUM 18 UNITS/KG/HR: 10000 INJECTION, SOLUTION INTRAVENOUS at 15:05

## 2024-04-17 RX ADMIN — OXYCODONE 5 MG: 5 TABLET ORAL at 07:54

## 2024-04-17 RX ADMIN — GABAPENTIN 100 MG: 100 CAPSULE ORAL at 05:48

## 2024-04-17 RX ADMIN — METHOCARBAMOL 750 MG: 750 TABLET ORAL at 08:34

## 2024-04-17 RX ADMIN — ACETAMINOPHEN 1000 MG: 500 TABLET ORAL at 05:48

## 2024-04-17 RX ADMIN — METHOCARBAMOL 750 MG: 750 TABLET ORAL at 13:49

## 2024-04-17 RX ADMIN — ASPIRIN 81 MG: 81 TABLET, COATED ORAL at 08:34

## 2024-04-17 RX ADMIN — FENTANYL CITRATE 50 MCG: 50 INJECTION INTRAMUSCULAR; INTRAVENOUS at 00:58

## 2024-04-17 RX ADMIN — GABAPENTIN 100 MG: 100 CAPSULE ORAL at 20:18

## 2024-04-17 ASSESSMENT — PAIN DESCRIPTION - LOCATION
LOCATION: LEG;FOOT
LOCATION: LEG
LOCATION: FOOT

## 2024-04-17 ASSESSMENT — PAIN SCALES - GENERAL
PAINLEVEL_OUTOF10: 2
PAINLEVEL_OUTOF10: 8
PAINLEVEL_OUTOF10: 2
PAINLEVEL_OUTOF10: 8
PAINLEVEL_OUTOF10: 8
PAINLEVEL_OUTOF10: 7
PAINLEVEL_OUTOF10: 8
PAINLEVEL_OUTOF10: 3
PAINLEVEL_OUTOF10: 3
PAINLEVEL_OUTOF10: 8
PAINLEVEL_OUTOF10: 8
PAINLEVEL_OUTOF10: 9
PAINLEVEL_OUTOF10: 5
PAINLEVEL_OUTOF10: 8
PAINLEVEL_OUTOF10: 6
PAINLEVEL_OUTOF10: 8

## 2024-04-17 ASSESSMENT — PAIN DESCRIPTION - ORIENTATION
ORIENTATION: RIGHT

## 2024-04-17 ASSESSMENT — ENCOUNTER SYMPTOMS: GASTROINTESTINAL NEGATIVE: 1

## 2024-04-17 ASSESSMENT — PAIN DESCRIPTION - DESCRIPTORS: DESCRIPTORS: SPASM;PINS AND NEEDLES

## 2024-04-17 NOTE — PLAN OF CARE
Problem: Discharge Planning  Goal: Discharge to home or other facility with appropriate resources  4/17/2024 1930 by Gabi Lancaster RN  Outcome: Progressing  4/17/2024 0906 by Marcellus Mabry RN  Outcome: Progressing     Problem: Pain  Goal: Verbalizes/displays adequate comfort level or baseline comfort level  4/17/2024 1930 by Gabi Lancaster RN  Outcome: Progressing  4/17/2024 0906 by Marcellus Mabry RN  Outcome: Progressing     Problem: Safety - Adult  Goal: Free from fall injury  4/17/2024 1930 by Gabi Lancaster RN  Outcome: Progressing  4/17/2024 0906 by Marcellus Mabry RN  Outcome: Progressing     Problem: Skin/Tissue Integrity  Goal: Absence of new skin breakdown  Description: 1.  Monitor for areas of redness and/or skin breakdown  2.  Assess vascular access sites hourly  3.  Every 4-6 hours minimum:  Change oxygen saturation probe site  4.  Every 4-6 hours:  If on nasal continuous positive airway pressure, respiratory therapy assess nares and determine need for appliance change or resting period.  4/17/2024 1930 by Gabi Lancaster RN  Outcome: Progressing  4/17/2024 0906 by Marcellus Mabry RN  Outcome: Progressing     Problem: Respiratory - Adult  Goal: Achieves optimal ventilation and oxygenation  4/17/2024 1930 by Gabi Lancaster RN  Outcome: Progressing  4/17/2024 1104 by J Luis Solomon RCP  Outcome: Progressing  4/17/2024 0906 by Marcellus Mabry RN  Outcome: Progressing     Problem: ABCDS Injury Assessment  Goal: Absence of physical injury  4/17/2024 1930 by Gabi Lancaster RN  Outcome: Progressing  4/17/2024 0906 by Marcellus Mabry RN  Outcome: Progressing

## 2024-04-17 NOTE — PLAN OF CARE
BRONCHOSPASM/BRONCHOCONSTRICTION     [x]         IMPROVE AERATION/BREATH SOUNDS  [x]   ADMINISTER BRONCHODILATOR THERAPY AS APPROPRIATE  [x]   ASSESS BREATH SOUNDS  [x]   IMPLEMENT AEROSOL/MDI PROTOCOL  [x]   PATIENT EDUCATION AS NEEDED    Problem: Respiratory - Adult  Goal: Achieves optimal ventilation and oxygenation  4/17/2024 1104 by J Luis Solomon, MARIA EUGENIA  Outcome: Progressing  4/17/2024 0906 by Marcellus Mabry, RN  Outcome: Progressing  4/17/2024 0020 by Monica Suero, RN  Outcome: Progressing  4/16/2024 2126 by Shruti Velázquez RCP  Outcome: Progressing

## 2024-04-17 NOTE — PROGRESS NOTES
[facility]  Office: [phone]  Naveed Barrios, DO, Ward Carballo, DO, Hal Redamn DO, Dalton Ruiz, DO, Mariusz Franklin MD, Roseanna Sorenson MD, Danette De La Garza MD, Annabel Marshall MD,  Mic Daily MD, Kaushik Marin MD, Noe Abbott MD,  Saumya Whitehead DO, Farshad Barraza MD, Lavon Randolph MD, Albert Barrios DO, Alma Delia Mc MD,  Indio Vidal DO, Le Robbins MD, Khloe Britt MD, Tasha Batista MD, Kassidy Brown MD,  Gurjit Larry MD, Jennifer Herring MD, Elio Spaulding MD, Jackie Collins MD, Edgar Matias MD, Peng Torres MD, Babak Russell DO, Phong De DO, Francesco Peña MD,  Alfredo Mcdaniel MD, Shirley Waterhouse, CNP,  Monica Rob CNP, Arvin Garcia, CNP,  Kia Vides, DNP, Risa Briones, CNP, Richelle Chandler, CNP, Anca Suarez CNP, Jasmina Ayala, CNP, Jaqueline Anderson, PA-C, Pattie Jin PA-C, Ebonie Ying, CNP, Hayde Laurent, CNP, Ginette Casillas, CNP, Katie Neito, CNP, Isabel Martínez, CNP, Jaci Saavedra, CNS, Lisa Downey, CNP, Paige Holden CNP, Tracy Schwab, CNP         [facility]   IN-PATIENT SERVICE   [facility]    Progress Note    4/17/2024    1:25 PM    Name:   Crispin Marcum  MRN:     [id number]     Acct:      [id number]   Room:   1021/1021-01   Day:  2  Admit Date:  4/15/2024  5:58 PM    PCP:   Gio Rodriguez DO  Code Status:  Full Code    Subjective:     C/C:   Chief Complaint   Patient presents with    Circulatory Problem     Interval History Status: Improved  Seen and examined  His improved with pain medication  Vascular team is following recommendation to continue with heparin drip  Vital sign lab were reviewed  Discussed with RN at bedside  Review of Systems:     Review of Systems   Constitutional: Negative.    Cardiovascular: Negative.    Gastrointestinal: Negative.    Genitourinary: Negative.    Psychiatric/Behavioral: Negative.         Medications:     Allergies:  No Known Allergies    Current

## 2024-04-17 NOTE — PROGRESS NOTES
Occupational Therapy  Facility/Department: [facility] CAR 1- SICU  Occupational Therapy Initial Assessment    Name: Crispin Marcum  : 1960  MRN: [id number]  Date of Service: 2024    Discharge Recommendations: Further therapy recommended at discharge.     OT Equipment Recommendations  Equipment Needed: Yes  Mobility Devices: ADL Assistive Devices  ADL Assistive Devices: Shower Chair with back;Grab Bars - shower;Grab Bars - toilet       Patient Diagnosis(es): The primary encounter diagnosis was Occlusion of right tibial artery (HCC). Diagnoses of COPD exacerbation (HCC), Oxygen desaturation, Chronic ulcer of right midfoot limited to breakdown of skin (HCC), and S/P femoral-popliteal bypass surgery were also pertinent to this visit.  Past Medical History:  has a past medical history of Autonomic nervous system disorder, Frostbite, and History of femoral angiogram.  Past Surgical History:  has a past surgical history that includes Foot amputation through metatarsal; Sympathectomy; femoral bypass (2016); vascular surgery (Right, 4/15/2024); and femoral bypass (Right, 2024).           Assessment   Performance deficits / Impairments: Decreased functional mobility ;Decreased ADL status;Decreased endurance;Decreased high-level IADLs  Assessment: Pt dem above deficits impacting participation and independence in ADLs. Pt would benefit from continued acute OT as well as upon discharge to increase safe independence during engagement in ADLs as well as to increase endurance during functional activities.  Prognosis: Good  Decision Making: Medium Complexity  REQUIRES OT FOLLOW-UP: Yes  Activity Tolerance  Activity Tolerance: Patient Tolerated treatment well;Patient limited by pain        Plan   Occupational Therapy Plan  Times Per Week: 3-4x/wk     Restrictions  Restrictions/Precautions  Required Braces or Orthoses?: No  Position Activity Restriction  Other position/activity restrictions: s/p R endartectomy ; SPB

## 2024-04-17 NOTE — OP NOTE
Operative Note      Patient: Crispin Marcum  YOB: 1960  MRN: [id number]    Date of Procedure: 4/16/2024    Pre-Op Diagnosis: Acute limb ischemia of right lower extremity    Post-Op Diagnosis: Same       Procedures:  1) Lysis catheter check and cessation of therapy  2) Dissection in redo right groin  3) Right external iliac, common femoral, profunda thrombectomy via groin incision  4) Patch angioplasty of right common femoral and profunda arteries with bovine pericardium  5) Balloon angioplasty of distal anastomosis of right common femoral to above knee popliteal artery PTFE bypass with 4 mm x 80 mm drug coated balloon  6) Interposition bypass from right common femoral artery patch to PTFE fem-pop bypass graft using 6 mm PTFE graft in the groin    Surgeon(s):  Kiarra Marin MD    Assistant: Anca Damon DO (Resident)    Anesthesia: General    Estimated Blood Loss (mL): 200 mL    Complications: None    Specimens: None    Implants:  Implant Name Type Inv. Item Serial No.  Lot No. LRB No. Used Action   CLIP INT WECK SM WIDE RED TI TRNSVRS GRV CHEVRON SHP W/ PERCIS TIP 6 PER PK - [id number]  CLIP INT WECK SM WIDE RED TI TRNSVRS GRV CHEVRON SHP W/ PERCIS TIP 6 PER PK  [employer]  Right 1 Implanted   CLIP LIG M PILY TI HRT SHP WIRE HORZ 6 CLIPS PER PK - [id number]  CLIP LIG M PILY TI HRT SHP WIRE HORZ 6 CLIPS PER PK  [employer]  Right 1 Implanted   GRAFT VASC W0.8XL8CM THK0.35-0.75MM CAR PERICARD PROC BOV - [id number] Vascular grafts GRAFT VASC W0.8XL8CM THK0.35-0.75MM CAR PERICARD PROC BOV  [employer] [id number] Right 1 Implanted   GRAFT VASC L50CM DIA6MM PTFE CBAS HEP SURF THN WALLED REM - [id number] Vascular grafts GRAFT VASC L50CM DIA6MM PTFE CBAS HEP SURF THN WALLED REM [id number]  [employer]  Right 1 Implanted         Drains:   NG/OG/NJ/NE Tube Orogastric 18 fr Center mouth (Active)       Urinary Catheter  04/16/24 2 Way (Active)       Findings:  Infection Present At Time Of Surgery (PATOS) (choose all levels that have infection present):  No infection present  Other Findings: Right EIA had opened after lysis but there was a lot of thrombus seen in the CFA and EIA. There was improved flow in the profunda. The bypass and SFA did not have any flow. Numerous subacute thrombus was removed from right EIA, CFA and profunda. The native SFA is occluded. Thrombus was removed from bypass graft. There was stenosis at the distal anastomosis that opened up after PTA. There is diminutive popliteal followed by collaterals. At the level of ankle appears to be peroneal and small caliber PT but unclear if these are collateral vessels. There is no AT or DP seen.  Flow is more rapid through the profunda collaterals then the bypass graft but contrast was seen traveling through both to the popliteal.    Detailed Description of Procedure:   Mr. Marcum was brought to the hybrid room placed in supine position.  He was intubated and sedated by the anesthesia team.  His abdomen, bilateral groins and right leg were prepped and draped in standard sterile fashion.  The left groin catheter and sheath were prepped as well.  A timeout was performed.  I placed an 035 wire through the lysis catheter and the end of the wire was in the right profunda artery.  I then removed the lysis catheter from the patient.  I performed a angiogram through the sheath which revealed significance thrombosis still in the right common femoral artery.  I then made a longitudinal incision over the right common femoral artery using electrocautery I dissected through extensive scar until identified the bypass graft.  Also identified the inguinal ligament.  I then was able to dissect through the scar and get circumferential control of the right common femoral, profunda and superficial femoral artery. Weight-based heparin bolus was given and then redosed hourly.    I got

## 2024-04-17 NOTE — PROGRESS NOTES
Physical Therapy  Facility/Department: [facility] CAR 1- SICU  Physical Therapy Initial Assessment    Name: Crispin Marcum  : 1960  MRN: [id number]  Date of Service: 2024  Chief Complaint   Patient presents with    Circulatory Problem       Discharge Recommendations:   Further therapy recommended at discharge.The patient should be able to tolerate at least 3 hours of therapy per day over 5 days or 15 hours over 7 days.   This patient may benefit from a Physical Medicine and Rehab consult.        PT Equipment Recommendations  Equipment Needed: No      Patient Diagnosis(es): The primary encounter diagnosis was Occlusion of right tibial artery (HCC). Diagnoses of COPD exacerbation (HCC), Oxygen desaturation, Chronic ulcer of right midfoot limited to breakdown of skin (HCC), and S/P femoral-popliteal bypass surgery were also pertinent to this visit.  Past Medical History:  has a past medical history of Autonomic nervous system disorder, Frostbite, and History of femoral angiogram.  Past Surgical History:  has a past surgical history that includes Foot amputation through metatarsal; Sympathectomy; femoral bypass (2016); vascular surgery (Right, 4/15/2024); and femoral bypass (Right, 2024).    Assessment   Body Structures, Functions, Activity Limitations Requiring Skilled Therapeutic Intervention: Decreased functional mobility ;Decreased strength;Decreased endurance;Decreased balance  Assessment: Pt amb 12' CGA RW. Pt reports not using AD at baseline, is currently limited by pain. Pt is currently unsafe to return to prior living arraingements. Pt would benefit from continued acute PT to address deficits.  Therapy Prognosis: Good  Decision Making: Medium Complexity  Requires PT Follow-Up: Yes  Activity Tolerance  Activity Tolerance: Patient limited by fatigue;Patient limited by endurance     Plan   Physical Therapy Plan  General Plan:  (5-6x/wk)  Current Treatment Recommendations: Strengthening, Balance  at baseline, only used RW for last few days)  Has the patient had two or more falls in the past year or any fall with injury in the past year?: No (pt reports '1 good one' d/t wife moving barstool prior to pt sitting, pt broke tailbonkalpesh)  Receives Help From: Family (wife in good health and home 24/7)  ADL Assistance: Independent  Homemaking Assistance: Independent  Homemaking Responsibilities: No (wife completes)  Ambulation Assistance: Independent  Transfer Assistance: Independent  Active : Yes  Mode of Transportation: SUV, Car  Occupation: Retired  Type of Occupation:   Leisure & Hobbies: [employer], playstation  Additional Comments: Spouse retired, able to assist 24/7.  Vision/Hearing  Vision  Vision: Impaired  Vision Exceptions: Wears glasses for reading  Hearing  Hearing: Within functional limits    Cognition   Orientation  Overall Orientation Status: Within Functional Limits  Orientation Level: Oriented to place;Oriented to time;Oriented to situation;Oriented to person  Cognition  Overall Cognitive Status: WFL     Objective   AROM RLE (degrees)  RLE AROM: WFL  RLE General AROM: WFL w/ exception of R ankle, no motion appreciated. Transmetatarsal amputation.  AROM LLE (degrees)  LLE AROM : WFL  LLE General AROM: WFL. Transmetatarsal amputation.  AROM RUE (degrees)  RUE General AROM: See OT  AROM LUE (degrees)  LUE General AROM: See OT  Strength RLE  Strength RLE: WFL  Comment: Grossly 3+/5 hip and ankle. Grossly 1/5.  Strength LLE  Strength LLE: WFL  Comment: Grossly 4/5  Strength RUE  Comment: See OT  Strength LUE  Comment: See OT        Bed mobility  Supine to Sit: Minimal assistance  Bed Mobility Comments: HOB elevated ~30 degrees. Pt in bed upon arrival, retired in chair upon exit. Pt required assist at trunk in supine to sit.  Transfers  Sit to Stand: Contact guard assistance  Stand to Sit: Contact guard assistance  Comment: Performed from bed w/ RW.  Ambulation  Surface: Level

## 2024-04-17 NOTE — PLAN OF CARE
Problem: Discharge Planning  Goal: Discharge to home or other facility with appropriate resources  4/17/2024 0906 by Marcellus Mabry RN  Outcome: Progressing  4/17/2024 0020 by Monica Suero RN  Outcome: Progressing     Problem: Pain  Goal: Verbalizes/displays adequate comfort level or baseline comfort level  4/17/2024 0906 by Marcellus Mabry RN  Outcome: Progressing  4/17/2024 0020 by Monica Suero RN  Outcome: Progressing     Problem: Safety - Adult  Goal: Free from fall injury  4/17/2024 0906 by Marcellus Mabry RN  Outcome: Progressing  4/17/2024 0020 by Monica Suero RN  Outcome: Progressing     Problem: Skin/Tissue Integrity  Goal: Absence of new skin breakdown  Description: 1.  Monitor for areas of redness and/or skin breakdown  2.  Assess vascular access sites hourly  3.  Every 4-6 hours minimum:  Change oxygen saturation probe site  4.  Every 4-6 hours:  If on nasal continuous positive airway pressure, respiratory therapy assess nares and determine need for appliance change or resting period.  4/17/2024 0906 by Marcellus Mabry RN  Outcome: Progressing  4/17/2024 0020 by Monica Suero RN  Outcome: Progressing     Problem: Respiratory - Adult  Goal: Achieves optimal ventilation and oxygenation  4/17/2024 0906 by Marcellus Mabry RN  Outcome: Progressing  4/17/2024 0020 by Monica Suero RN  Outcome: Progressing  4/16/2024 2126 by Shruti Velázquez RCP  Outcome: Progressing     Problem: ABCDS Injury Assessment  Goal: Absence of physical injury  4/17/2024 0906 by Marcellus Mabry RN  Outcome: Progressing  4/17/2024 0020 by Monica Suero RN  Outcome: Progressing

## 2024-04-17 NOTE — PROGRESS NOTES
[facility]  Occupational Therapy Not Seen Note    DATE: 2024    NAME: Crispin Marcum  MRN: [id number]   : 1960      Patient not seen this date for Occupational Therapy due to:    Strict Bedrest:clarifying     Next Scheduled Treatment: check back later as able     Electronically signed by RUTH ANN Reddy on 2024 at 8:26 AM

## 2024-04-17 NOTE — PROGRESS NOTES
Vascular Surgery   Progress Note    PATIENT NAME: Crispin Marcum     TODAY'S DATE: 4/17/2024, 12:50 PM    SUBJECTIVE:     Pt seen and examined at bedside.  No acute overnight events. Pt has pain in the RLE with movement and sensation. There is a popliteal signal.   He denies chest pain, shortness of breath, fever or chills.    OBJECTIVE:   VITALS:  /64   Pulse 68   Temp 98.8 °F (37.1 °C) (Oral)   Resp 15   Wt 118.8 kg (261 lb 14.5 oz)   SpO2 98%   BMI 35.52 kg/m²      INTAKE/OUTPUT:      Intake/Output Summary (Last 24 hours) at 4/17/2024 1250  Last data filed at 4/17/2024 1132  Gross per 24 hour   Intake 4627.18 ml   Output 2440 ml   Net 2187.18 ml       PHYSICAL EXAM:  General Appearance: awake, alert, oriented, in no acute distress  HEENT:  Normocephalic, atraumatic, mucus membranes moist   Heart: Regular rate and rhythm  Lungs: normal effort with symmetric rise and fall of chest wall  Abdomen: soft, nondistended, nontender to palpation  Extremities: RLE - Absent DP, PT signal, popliteal signal present, decreased range of motion of the foot compared to the left.   DP and PT signal present in the left.  Skin: Skin color, texture, turgor normal. No rashes or lesions.      Data:  CBC:   Recent Labs     04/16/24  0633 04/16/24  1220 04/17/24  0310   WBC 11.6* 10.3 12.9*   HGB 14.9 15.0 13.0    See Reflexed IPF Result 128*     Chemistry:   Recent Labs     04/15/24  1032 04/15/24  1824 04/16/24  0633 04/17/24  0310     --  140 140   K 4.1  --  4.5 4.7     --  107 105   CO2 24  --  21 17*   GLUCOSE 97  --  108* 156*   BUN 15  --  16 17   CREATININE 0.8  --  0.9 0.8   ANIONGAP 11  --  12 18*   LABGLOM >90  --  >90 >90   CALCIUM 9.3  --  8.7 8.5*   PROBNP  --  72  --   --    TROPHS  --  21  --   --        Coagulation:   Recent Labs     04/15/24  1032 04/15/24  1256 04/15/24  1824 04/15/24  2258 04/16/24  0633 04/16/24  1220   APTT 30.1   < > >180.0* >180.0* 31.7 31.6   PROT  --   --   --   --

## 2024-04-17 NOTE — PLAN OF CARE
Problem: Discharge Planning  Goal: Discharge to home or other facility with appropriate resources  Outcome: Progressing     Problem: Pain  Goal: Verbalizes/displays adequate comfort level or baseline comfort level  Outcome: Progressing     Problem: Safety - Adult  Goal: Free from fall injury  Outcome: Progressing     Problem: Skin/Tissue Integrity  Goal: Absence of new skin breakdown  Description: 1.  Monitor for areas of redness and/or skin breakdown  2.  Assess vascular access sites hourly  3.  Every 4-6 hours minimum:  Change oxygen saturation probe site  4.  Every 4-6 hours:  If on nasal continuous positive airway pressure, respiratory therapy assess nares and determine need for appliance change or resting period.  Outcome: Progressing     Problem: Respiratory - Adult  Goal: Achieves optimal ventilation and oxygenation  4/17/2024 0020 by Monica Suero RN  Outcome: Progressing  4/16/2024 2126 by Shruti Velázquez RCP  Outcome: Progressing     Problem: ABCDS Injury Assessment  Goal: Absence of physical injury  Outcome: Progressing

## 2024-04-17 NOTE — ANESTHESIA POSTPROCEDURE EVALUATION
Department of Anesthesiology  Postprocedure Note    Patient: Crispin Marcum  MRN: [id number]  YOB: 1960  Date of evaluation: 4/17/2024    Procedure Summary       Date: 04/16/24 Room / Location: [place] [address] / [facility]    Anesthesia Start: 1448 Anesthesia Stop: 2027    Procedure: LYSIS CHECK , RIGHT FEMORAL ENDARTERECTOMY, CHRISTIANO, COMMON FEMORAL JUMP BYPASS WITH ANGIOGRAM (Right: Groin) Diagnosis:       Ischemic leg      (Ischemic leg [I99.8])    Surgeons: Kiarra Marin MD Responsible Provider: Amy Edouard MD    Anesthesia Type: MAC, general ASA Status: 3            Anesthesia Type: No value filed.    Brandy Phase I: Brandy Score: 9    Brandy Phase II:      Anesthesia Post Evaluation    Patient location during evaluation: bedside  Patient participation: complete - patient participated  Level of consciousness: awake  Airway patency: patent  Nausea & Vomiting: no nausea and no vomiting  Cardiovascular status: hemodynamically stable  Respiratory status: acceptable  Hydration status: stable  Comments: /63   Pulse 74   Temp 99.1 °F (37.3 °C)   Resp 17   Wt 118.8 kg (261 lb 14.5 oz)   SpO2 95%   BMI 35.52 kg/m²           No notable events documented.

## 2024-04-17 NOTE — PLAN OF CARE
Problem: Respiratory - Adult  Goal: Achieves optimal ventilation and oxygenation  Outcome: Progressing   BRONCHOSPASM/BRONCHOCONSTRICTION     [x]         IMPROVE AERATION/BREATH SOUNDS  [x]   ADMINISTER BRONCHODILATOR THERAPY AS APPROPRIATE  [x]   ASSESS BREATH SOUNDS  [x]   IMPLEMENT AEROSOL/MDI PROTOCOL  [x]   PATIENT EDUCATION AS NEEDED

## 2024-04-18 VITALS
TEMPERATURE: 99.4 F | OXYGEN SATURATION: 96 % | SYSTOLIC BLOOD PRESSURE: 141 MMHG | RESPIRATION RATE: 16 BRPM | HEART RATE: 94 BPM | DIASTOLIC BLOOD PRESSURE: 73 MMHG | BODY MASS INDEX: 35.46 KG/M2 | WEIGHT: 261.47 LBS

## 2024-04-18 LAB
ANION GAP SERPL CALCULATED.3IONS-SCNC: 10 MMOL/L (ref 9–16)
ANTI-XA UNFRAC HEPARIN: 0.63 IU/L
BUN SERPL-MCNC: 17 MG/DL (ref 8–23)
CALCIUM SERPL-MCNC: 8.4 MG/DL (ref 8.6–10.4)
CHLORIDE SERPL-SCNC: 103 MMOL/L (ref 98–107)
CO2 SERPL-SCNC: 25 MMOL/L (ref 20–31)
CREAT SERPL-MCNC: 0.8 MG/DL (ref 0.7–1.2)
GFR SERPL CREATININE-BSD FRML MDRD: >90 ML/MIN/1.73M2
GLUCOSE SERPL-MCNC: 124 MG/DL (ref 74–99)
POTASSIUM SERPL-SCNC: 4 MMOL/L (ref 3.7–5.3)
SODIUM SERPL-SCNC: 138 MMOL/L (ref 136–145)

## 2024-04-18 PROCEDURE — 6370000000 HC RX 637 (ALT 250 FOR IP): Performed by: STUDENT IN AN ORGANIZED HEALTH CARE EDUCATION/TRAINING PROGRAM

## 2024-04-18 PROCEDURE — 99239 HOSP IP/OBS DSCHRG MGMT >30: CPT | Performed by: STUDENT IN AN ORGANIZED HEALTH CARE EDUCATION/TRAINING PROGRAM

## 2024-04-18 PROCEDURE — 2700000000 HC OXYGEN THERAPY PER DAY

## 2024-04-18 PROCEDURE — 94640 AIRWAY INHALATION TREATMENT: CPT

## 2024-04-18 PROCEDURE — 80048 BASIC METABOLIC PNL TOTAL CA: CPT

## 2024-04-18 PROCEDURE — 6370000000 HC RX 637 (ALT 250 FOR IP)

## 2024-04-18 PROCEDURE — 94761 N-INVAS EAR/PLS OXIMETRY MLT: CPT

## 2024-04-18 PROCEDURE — 85520 HEPARIN ASSAY: CPT

## 2024-04-18 PROCEDURE — 6360000002 HC RX W HCPCS

## 2024-04-18 PROCEDURE — 6360000002 HC RX W HCPCS: Performed by: STUDENT IN AN ORGANIZED HEALTH CARE EDUCATION/TRAINING PROGRAM

## 2024-04-18 PROCEDURE — 2580000003 HC RX 258

## 2024-04-18 PROCEDURE — 36415 COLL VENOUS BLD VENIPUNCTURE: CPT

## 2024-04-18 RX ORDER — OXYCODONE HYDROCHLORIDE 5 MG/1
5 TABLET ORAL EVERY 4 HOURS PRN
Qty: 20 TABLET | Refills: 0 | Status: SHIPPED | OUTPATIENT
Start: 2024-04-18 | End: 2024-04-23

## 2024-04-18 RX ORDER — ATORVASTATIN CALCIUM 80 MG/1
80 TABLET, FILM COATED ORAL DAILY
Qty: 30 TABLET | Refills: 3 | Status: SHIPPED | OUTPATIENT
Start: 2024-04-19

## 2024-04-18 RX ORDER — GABAPENTIN 100 MG/1
100 CAPSULE ORAL EVERY 8 HOURS
Qty: 90 CAPSULE | Refills: 0 | Status: SHIPPED | OUTPATIENT
Start: 2024-04-18 | End: 2024-05-18

## 2024-04-18 RX ORDER — ASPIRIN 81 MG/1
81 TABLET ORAL DAILY
Qty: 30 TABLET | Refills: 3 | Status: SHIPPED | OUTPATIENT
Start: 2024-04-19

## 2024-04-18 RX ORDER — METHOCARBAMOL 750 MG/1
750 TABLET, FILM COATED ORAL EVERY 6 HOURS
Qty: 40 TABLET | Refills: 0 | Status: SHIPPED | OUTPATIENT
Start: 2024-04-18 | End: 2024-04-28

## 2024-04-18 RX ORDER — GUAIFENESIN 600 MG/1
600 TABLET, EXTENDED RELEASE ORAL 2 TIMES DAILY
Qty: 14 TABLET | Refills: 0 | Status: SHIPPED | OUTPATIENT
Start: 2024-04-18

## 2024-04-18 RX ADMIN — GABAPENTIN 100 MG: 100 CAPSULE ORAL at 04:58

## 2024-04-18 RX ADMIN — IPRATROPIUM BROMIDE AND ALBUTEROL SULFATE 1 DOSE: 2.5; .5 SOLUTION RESPIRATORY (INHALATION) at 11:20

## 2024-04-18 RX ADMIN — OXYCODONE 5 MG: 5 TABLET ORAL at 08:58

## 2024-04-18 RX ADMIN — APIXABAN 10 MG: 5 TABLET, FILM COATED ORAL at 08:59

## 2024-04-18 RX ADMIN — METHOCARBAMOL 750 MG: 750 TABLET ORAL at 02:41

## 2024-04-18 RX ADMIN — GUAIFENESIN 600 MG: 600 TABLET, EXTENDED RELEASE ORAL at 08:59

## 2024-04-18 RX ADMIN — METHOCARBAMOL 750 MG: 750 TABLET ORAL at 08:58

## 2024-04-18 RX ADMIN — HEPARIN SODIUM 18 UNITS/KG/HR: 10000 INJECTION, SOLUTION INTRAVENOUS at 06:48

## 2024-04-18 RX ADMIN — SODIUM CHLORIDE, PRESERVATIVE FREE 5 ML: 5 INJECTION INTRAVENOUS at 09:00

## 2024-04-18 RX ADMIN — ATORVASTATIN CALCIUM 80 MG: 80 TABLET, FILM COATED ORAL at 08:59

## 2024-04-18 RX ADMIN — ASPIRIN 81 MG: 81 TABLET, COATED ORAL at 08:59

## 2024-04-18 RX ADMIN — IPRATROPIUM BROMIDE AND ALBUTEROL SULFATE 1 DOSE: 2.5; .5 SOLUTION RESPIRATORY (INHALATION) at 07:53

## 2024-04-18 RX ADMIN — ACETAMINOPHEN 1000 MG: 500 TABLET ORAL at 04:58

## 2024-04-18 RX ADMIN — GABAPENTIN 100 MG: 100 CAPSULE ORAL at 12:53

## 2024-04-18 RX ADMIN — ACETAMINOPHEN 1000 MG: 500 TABLET ORAL at 12:53

## 2024-04-18 RX ADMIN — FENTANYL CITRATE 50 MCG: 50 INJECTION INTRAMUSCULAR; INTRAVENOUS at 11:15

## 2024-04-18 ASSESSMENT — PAIN SCALES - GENERAL
PAINLEVEL_OUTOF10: 8
PAINLEVEL_OUTOF10: 2
PAINLEVEL_OUTOF10: 7
PAINLEVEL_OUTOF10: 8
PAINLEVEL_OUTOF10: 9
PAINLEVEL_OUTOF10: 6

## 2024-04-18 ASSESSMENT — PAIN DESCRIPTION - ORIENTATION
ORIENTATION: RIGHT

## 2024-04-18 ASSESSMENT — PAIN DESCRIPTION - LOCATION
LOCATION: LEG
LOCATION: LEG
LOCATION: FOOT
LOCATION: FOOT

## 2024-04-18 NOTE — PROGRESS NOTES
04/18/24 0753   Care Plan - Respiratory Goals   Achieves optimal ventilation and oxygenation Assess for changes in respiratory status;Position to facilitate oxygenation and minimize respiratory effort;Assess for changes in mentation and behavior;Oxygen supplementation based on oxygen saturation or arterial blood gases;Encourage broncho-pulmonary hygiene including cough, deep breathe, incentive spirometry;Assess the need for suctioning and aspirate as needed;Assess and instruct to report shortness of breath or any respiratory difficulty;Respiratory therapy support as indicated

## 2024-04-18 NOTE — FLOWSHEET NOTE
Writer went over discharge instructions, explained medication changes/new medications, follow up appointments reviewed, all belongings sent with pt and wife. All questions answered.

## 2024-04-18 NOTE — DISCHARGE SUMMARY
[facility]  Office: [phone]  Naveed Barrios DO, Ward Carballo DO, Hal Redman DO, Dalton Ruiz DO, Mariusz Franklin MD, Roseanna Sorenson MD, Danette De La Garza MD, Annabel Marshall MD,  Mic Daily MD, Kaushik Marin MD, Noe Abbott MD,  Saumya Whitehead DO, Farshad Barraza MD, Lavon Randolph MD, Albert Barrios DO, lAma Delia Mc MD,  Indio Vidal DO, Le Robbins MD, Khloe Britt MD, Tasha Batista MD, Kassidy Brown MD,  Gurjit Larry MD, Jennifer Herring MD, Elio Spaulding MD, Jackie Collins MD, Edgar Matias MD, Peng Torres MD, Babak Russell DO, Phong De DO, Francesco Peña MD,  Alfredo Mcdaniel MD, Shirley Waterhouse, CNP,  Monica Rob CNP, Arvin Garcia, CNP,  Kia Vides, DNP, Risa Briones, CNP, Richelle Chandler, CNP, Anca Suarez CNP, Jasmina Ayala, CNP, Jaqueline Anderson, PA-C, Pattie Jin PA-C, Ebonie Ying, CNP, Hayde Laurent, CNP, Ginette Casillas, CNP, Katie Nieto, CNP, Isabel Martínez, CNP, Jaci Saavedra, CNS, Lisa Downey, CNP, Paige Holden CNP, Tracy Schwab, CNP         [facility]   IN-PATIENT SERVICE   [facility]    Discharge Summary     Patient ID: Crispin Marcum  :  1960   MRN: [id number]     ACCOUNT:  [id number]   Patient's PCP: Gio Rodriguez DO  Admit Date: 4/15/2024   Discharge Date: 2024     Length of Stay: 3  Code Status:  Full Code  Admitting Physician: Jackie Collins MD  Discharge Physician: Jackie Collins MD     Active Discharge Diagnoses:     Hospital Problem Lists:  Principal Problem:    Arterial occlusion  Active Problems:    Occlusion of right femoral artery (HCC)    Acute lower limb ischemia    S/P femoral-popliteal bypass surgery    Tobacco abuse    Suspected sleep apnea    Chronic bronchitis (HCC)  Resolved Problems:    * No resolved hospital problems. *      Admission Condition:  fair     Discharged Condition: good    Hospital Stay:     Hospital Course:    63 y.o.      K 4.0 04/18/2024 05:35 AM     04/18/2024 05:35 AM    CO2 25 04/18/2024 05:35 AM    ANIONGAP 10 04/18/2024 05:35 AM    BUN 17 04/18/2024 05:35 AM    CREATININE 0.8 04/18/2024 05:35 AM    CREATININE 0.83 11/04/2020 10:00 AM    BUNCRER 19 04/15/2024 10:32 AM    CALCIUM 8.4 04/18/2024 05:35 AM    LABGLOM >90 04/18/2024 05:35 AM    GFRAA >60 03/02/2022 01:21 PM    GFR      03/02/2022 01:21 PM    GFR      03/02/2022 01:21 PM        Radiology:  XR CHEST PORTABLE    Result Date: 4/16/2024  No acute cardiopulmonary process.     XR CHEST PORTABLE    Result Date: 4/15/2024  No radiographic evidence of acute cardiopulmonary disease.     CTA ABDOMINAL AORTA W BILAT RUNOFF W CONTRAST    Result Date: 4/15/2024  Occlusion of the right external iliac artery, right common femoral artery, profundus femoris artery, and right superficial femoral artery.  Collateral blood flow from the right gluteal arteries results in opacification of the right popliteal artery.  Occlusion of the right anterior tibial and posterior tibial arteries is also noted with persistent opacification of the distal right peroneal artery. Probable occlusion of the left anterior tibial artery and proximal right posterior tibial artery.  Multiple collateral vessels are seen within the left leg, ankle, and foot.  Aberrant left anterior tibial artery versus collateral vessel anterior to the peroneal artery. Dilatation of the infrarenal abdominal aorta which measures a maximum 4.2 cm. Additional findings of vascular disease noted above. Hepatic steatosis. Diverticulosis evidence of diverticulitis. Ulceration involving the lateral aspect of the right foot without definite associated collection.  The possibility of osteomyelitis cannot be excluded on this exam. Additional findings noted above.       Consultations:    Consults:     Final Specialist Recommendations/Findings:   IP CONSULT TO VASCULAR SURGERY  IP CONSULT TO INTERNAL MEDICINE  IP CONSULT TO HOME CARE

## 2024-04-18 NOTE — PLAN OF CARE
Problem: Discharge Planning  Goal: Discharge to home or other facility with appropriate resources  Outcome: Progressing     Problem: Pain  Goal: Verbalizes/displays adequate comfort level or baseline comfort level  Outcome: Progressing     Problem: Safety - Adult  Goal: Free from fall injury  Outcome: Progressing  Flowsheets (Taken 4/18/2024 1015)  Free From Fall Injury: Instruct family/caregiver on patient safety     Problem: Skin/Tissue Integrity  Goal: Absence of new skin breakdown  Description: 1.  Monitor for areas of redness and/or skin breakdown  2.  Assess vascular access sites hourly  3.  Every 4-6 hours minimum:  Change oxygen saturation probe site  4.  Every 4-6 hours:  If on nasal continuous positive airway pressure, respiratory therapy assess nares and determine need for appliance change or resting period.  Outcome: Progressing     Problem: Respiratory - Adult  Goal: Achieves optimal ventilation and oxygenation  Outcome: Progressing  Flowsheets (Taken 4/18/2024 0753 by Argelia Del Rosario VONDA)  Achieves optimal ventilation and oxygenation:   Assess for changes in respiratory status   Position to facilitate oxygenation and minimize respiratory effort   Assess for changes in mentation and behavior   Oxygen supplementation based on oxygen saturation or arterial blood gases   Encourage broncho-pulmonary hygiene including cough, deep breathe, incentive spirometry   Assess the need for suctioning and aspirate as needed   Assess and instruct to report shortness of breath or any respiratory difficulty   Respiratory therapy support as indicated     Problem: ABCDS Injury Assessment  Goal: Absence of physical injury  Outcome: Progressing  Flowsheets (Taken 4/18/2024 1015)  Absence of Physical Injury: Implement safety measures based on patient assessment

## 2024-04-18 NOTE — DISCHARGE INSTRUCTIONS
Please continue to take your medication as prescribed, continue to take Eliquis 10 mg twice daily for total of 7 days then continue with 5 mg twice daily, please follow-up with your primary doctor within 1 week of discharge to repeat CBC-BMP and to check your vital sign and to adjust your home medication if needed

## 2024-04-18 NOTE — PROGRESS NOTES
Vascular Surgery   Progress Note    PATIENT NAME: Crispin Marcum     TODAY'S DATE: 4/18/2024, 10:53 AM    SUBJECTIVE:     Pt seen and examined at bedside.  No acute overnight events. Pt has pain in the RLE with movement. He is able to feel pressure on the medial and lateral aspect of the right foot and has full sensation to the bottom and top of his foot. There is a popliteal signal. He denies chest pain, shortness of breath, fever or chills. He worked with physical therapy yesterday and reports he was unable to walk properly because he doesn't have the correct shoe.    OBJECTIVE:   VITALS:  BP (!) 141/78   Pulse 87   Temp 99.1 °F (37.3 °C) (Oral)   Resp 21   Wt 118.6 kg (261 lb 7.5 oz)   SpO2 94%   BMI 35.46 kg/m²      INTAKE/OUTPUT:      Intake/Output Summary (Last 24 hours) at 4/18/2024 1053  Last data filed at 4/18/2024 0620  Gross per 24 hour   Intake 1362.78 ml   Output 1600 ml   Net -237.22 ml         PHYSICAL EXAM:  General Appearance: awake, alert, oriented, in no acute distress  HEENT:  Normocephalic, atraumatic, mucus membranes moist   Heart: Regular rate and rhythm  Lungs: normal effort with symmetric rise and fall of chest wall  Abdomen: soft, nondistended, nontender to palpation  Extremities: RLE - Absent DP, PT signal, popliteal signal present, decreased range of motion of the foot compared to the left. The skin is warm and seems well perfused. There is some duskiness to the distal aspect of the foot (s/p TMA) but he has sensation to the plantar and dorsal aspect of the foot. He is able to feel pressure to the lateral and medial aspect of the right foot. Pt calf is soft and non-tender  DP and PT signal present in the left.  Skin: Skin color, texture, turgor normal. No rashes or lesions.      Data:  CBC:   Recent Labs     04/16/24  0633 04/16/24  1220 04/17/24  0310   WBC 11.6* 10.3 12.9*   HGB 14.9 15.0 13.0    See Reflexed IPF Result 128*       Chemistry:   Recent Labs     04/15/24  1824

## 2024-04-18 NOTE — CARE COORDINATION
Post Acute Facility/Agency List     Provided patient with the following list, the list includes the overall star ratings obtained from CMS per the Medicare Web site (www.Medicare.gov):     [] Long Term Acute Care Facilities  [] Acute Inpatient Rehabilitation Facilities  [] Skilled Nursing Facilities  [x] Home Care  [] Patient's Insurance specific coverage list for SNF    Provided verbal instructions on how to utilize the QR Code to obtain additional detailed star ratings from www.Medicare.gov    Pt choices for home health care are [facility], [facility], and [facility]. Referrals sent.     1100 Call to Aaliyah from [facility]. She will call CM back.     1103 Bernadette from [facility]- declined d/t staffing.     1106 Sue from [facility] able to accept pt with start of care 4/20/24.     1108 PS Dr Collins to update. Pt informed and agreeable to discharge plan.     1655 Received call from Maura from [facility]. Pt's home care order does not include nursing. PS sent to Dr Collins to request nursing added to order. Read receipt.

## 2024-05-07 ENCOUNTER — OFFICE VISIT (OUTPATIENT)
Dept: VASCULAR SURGERY | Age: 64
End: 2024-05-07

## 2024-05-07 VITALS
DIASTOLIC BLOOD PRESSURE: 73 MMHG | TEMPERATURE: 98.4 F | OXYGEN SATURATION: 94 % | RESPIRATION RATE: 18 BRPM | HEIGHT: 72 IN | WEIGHT: 261 LBS | HEART RATE: 87 BPM | SYSTOLIC BLOOD PRESSURE: 122 MMHG | BODY MASS INDEX: 35.35 KG/M2

## 2024-05-07 DIAGNOSIS — I99.8 ACUTE LOWER LIMB ISCHEMIA: Primary | ICD-10-CM

## 2024-05-07 PROCEDURE — 99024 POSTOP FOLLOW-UP VISIT: CPT | Performed by: STUDENT IN AN ORGANIZED HEALTH CARE EDUCATION/TRAINING PROGRAM

## 2024-05-07 NOTE — PROGRESS NOTES
[facility] PHYSICIANS [facility]  [address] [address] [address]  [place] [address]  Dept: [phone]     Patient: Crispin Marcum  : 1960  MRN: [id number]  DOS: 2024    HPI:  24: Crispin Marcum is a 63 y.o. male who comes to the office regarding post op follow up from hospital. Underwent right leg lysis catheter and then open cutdown on 24. He required patch angioplasty of his right CFA and profunda arteries. Then required a jump graft to his existing CFA to AK popliteal PTFE graft. He takes asa/eliquis/statin daily. Unfortunately was heating his foot and sustained injury to skin.    Past Medical History:   Diagnosis Date    Autonomic nervous system disorder     Frostbite     History of femoral angiogram      Family History   Problem Relation Age of Onset    Cancer Mother       Social History     Socioeconomic History    Marital status:      Spouse name: Not on file    Number of children: Not on file    Years of education: Not on file    Highest education level: Not on file   Occupational History    Not on file   Tobacco Use    Smoking status: Former     Current packs/day: 0.00     Types: Cigarettes     Quit date: 2016     Years since quittin.4    Smokeless tobacco: Never   Vaping Use    Vaping Use: Former   Substance and Sexual Activity    Alcohol use: No    Drug use: Not Currently    Sexual activity: Not on file   Other Topics Concern    Not on file   Social History Narrative    Not on file     Social Determinants of Health     Financial Resource Strain: Not on file   Food Insecurity: Not on file   Transportation Needs: Not on file   Physical Activity: Not on file   Stress: Not on file   Social Connections: Not on file   Intimate Partner Violence: Not on file   Housing Stability: Not on file      Past Surgical History:   Procedure Laterality Date    FEMORAL BYPASS  2016    FEMORAL BYPASS Right

## 2024-05-20 ASSESSMENT — ENCOUNTER SYMPTOMS
ABDOMINAL DISTENTION: 0
EYE PAIN: 0
ABDOMINAL PAIN: 0
COUGH: 0
VOICE CHANGE: 0
VOMITING: 0
CHEST TIGHTNESS: 0
TROUBLE SWALLOWING: 0
COLOR CHANGE: 0

## 2024-05-22 ENCOUNTER — OFFICE VISIT (OUTPATIENT)
Dept: VASCULAR SURGERY | Age: 64
End: 2024-05-22
Payer: COMMERCIAL

## 2024-05-22 VITALS
HEART RATE: 81 BPM | TEMPERATURE: 97.3 F | WEIGHT: 230 LBS | SYSTOLIC BLOOD PRESSURE: 161 MMHG | HEIGHT: 72 IN | DIASTOLIC BLOOD PRESSURE: 92 MMHG | RESPIRATION RATE: 16 BRPM | BODY MASS INDEX: 31.15 KG/M2

## 2024-05-22 DIAGNOSIS — I70.221 ATHEROSCLEROSIS OF NATIVE ARTERIES OF EXTREMITIES WITH REST PAIN, RIGHT LEG (HCC): ICD-10-CM

## 2024-05-22 DIAGNOSIS — I70.235 ATHEROSCLEROSIS OF NATIVE ARTERIES OF RIGHT LEG WITH ULCERATION OF OTHER PART OF FOOT (HCC): Primary | ICD-10-CM

## 2024-05-22 PROCEDURE — 1036F TOBACCO NON-USER: CPT | Performed by: SURGERY

## 2024-05-22 PROCEDURE — G8427 DOCREV CUR MEDS BY ELIG CLIN: HCPCS | Performed by: SURGERY

## 2024-05-22 PROCEDURE — G8417 CALC BMI ABV UP PARAM F/U: HCPCS | Performed by: SURGERY

## 2024-05-22 PROCEDURE — 99214 OFFICE O/P EST MOD 30 MIN: CPT | Performed by: SURGERY

## 2024-05-22 PROCEDURE — 3017F COLORECTAL CA SCREEN DOC REV: CPT | Performed by: SURGERY

## 2024-05-22 NOTE — PROGRESS NOTES
[facility] PHYSICIANS [facility] VASCULAR PART OF [address]  [facility] [address]  Dept: [phone]     Patient: Crispin Marcum  : 1960  MRN: [id number]  DOS: 2024    Referring provider:  No ref. provider found         HPI:  Crispin Marcum is a 63 y.o. male who returns to the office for right lower extremity ischemia with ulceration.  Recall that he has had a long history of bilateral lower extremity problems more so on the right than the left.  Recently he had lytic therapy of the external iliac, common femoral and bypass graft on the right side.  His bypass graft is in the configuration of the common femoral to popliteal bypass.  Lytic therapy did not work well for him and therefore he had the right groin open with endarterectomy and thrombectomy with bovine pericardial patch angioplasty at the level of the common femoral artery.  Currently I think he has flow to the right groin which had some difficulty in healing at the level of the skin.  He still has some ulcerations on the right groin which are looking better but minimally so.  The right foot is still very problematic with ischemia and coldness as well as ulceration.  Past Medical History:   Diagnosis Date    Autonomic nervous system disorder     Frostbite     History of femoral angiogram      Family History   Problem Relation Age of Onset    Cancer Mother       Social History     Socioeconomic History    Marital status:      Spouse name: Not on file    Number of children: Not on file    Years of education: Not on file    Highest education level: Not on file   Occupational History    Not on file   Tobacco Use    Smoking status: Former     Current packs/day: 0.00     Types: Cigarettes     Quit date: 2016     Years since quittin.4    Smokeless tobacco: Never   Vaping Use    Vaping Use: Former   Substance and Sexual Activity    Alcohol use:

## 2024-05-22 NOTE — PATIENT INSTRUCTIONS
SURVEY:    You may be receiving a survey from [employer] regarding your visit today.    Please complete the survey to enable us to provide the highest quality of care to you and your family.    If you cannot score us a very good on any question, please call the office to discuss how we could have made your experience a very good one.    Thank you.        Please recheck your blood pressure when you go home and make sure you take your prescribed medication if applicable .  Please follow up with your PCP or ER if needed.

## 2024-05-22 NOTE — H&P (VIEW-ONLY)
[facility] PHYSICIANS [facility], [facility] VASCULAR PART OF [address]  [facility] [address]  Dept: [phone]     Patient: Crispin Marcum  : 1960  MRN: [id number]  DOS: 2024    Referring provider:  No ref. provider found         HPI:  Crispin Marcum is a 63 y.o. male who returns to the office for right lower extremity ischemia with ulceration.  Recall that he has had a long history of bilateral lower extremity problems more so on the right than the left.  Recently he had lytic therapy of the external iliac, common femoral and bypass graft on the right side.  His bypass graft is in the configuration of the common femoral to popliteal bypass.  Lytic therapy did not work well for him and therefore he had the right groin open with endarterectomy and thrombectomy with bovine pericardial patch angioplasty at the level of the common femoral artery.  Currently I think he has flow to the right groin which had some difficulty in healing at the level of the skin.  He still has some ulcerations on the right groin which are looking better but minimally so.  The right foot is still very problematic with ischemia and coldness as well as ulceration.  Past Medical History:   Diagnosis Date    Autonomic nervous system disorder     Frostbite     History of femoral angiogram      Family History   Problem Relation Age of Onset    Cancer Mother       Social History     Socioeconomic History    Marital status:      Spouse name: Not on file    Number of children: Not on file    Years of education: Not on file    Highest education level: Not on file   Occupational History    Not on file   Tobacco Use    Smoking status: Former     Current packs/day: 0.00     Types: Cigarettes     Quit date: 2016     Years since quittin.4    Smokeless tobacco: Never   Vaping Use    Vaping Use: Former   Substance and Sexual Activity    Alcohol use:

## 2024-05-23 ENCOUNTER — TELEPHONE (OUTPATIENT)
Dept: VASCULAR SURGERY | Age: 64
End: 2024-05-23

## 2024-05-23 NOTE — TELEPHONE ENCOUNTER
Called and left voice message regarding the procedure and instructions including when to HOLD his Eliquis.  Also instructed to call me back if any questions.

## 2024-05-23 NOTE — TELEPHONE ENCOUNTER
Dr. Fernie ENGLE procedure for Right Lower ext. Arteriogram is on 5/29/24 at 1pm in IR at [facility].   Question is when to STOP or HOLD the Eliquis.  Please advise.    Arrival time 12 noon   Procedure time 1pm  NPO after 6AM  May take morning medications with a sip of water  Eliquis last dose 5/27 pm dose Hold the 5/28 and 5/29 dose    List of Medication        [facility] office was notified and place the case on your schedule for reference

## 2024-05-24 ENCOUNTER — NURSE ONLY (OUTPATIENT)
Dept: INTERVENTIONAL RADIOLOGY/VASCULAR | Age: 64
End: 2024-05-24

## 2024-05-28 ENCOUNTER — TELEPHONE (OUTPATIENT)
Dept: VASCULAR SURGERY | Age: 64
End: 2024-05-28

## 2024-05-28 DIAGNOSIS — I70.221 ATHEROSCLEROSIS OF NATIVE ARTERIES OF EXTREMITIES WITH REST PAIN, RIGHT LEG (HCC): ICD-10-CM

## 2024-05-28 DIAGNOSIS — I99.8 ACUTE LOWER LIMB ISCHEMIA: Primary | ICD-10-CM

## 2024-05-28 DIAGNOSIS — I70.235 ATHEROSCLEROSIS OF NATIVE ARTERIES OF RIGHT LEG WITH ULCERATION OF OTHER PART OF FOOT (HCC): ICD-10-CM

## 2024-05-28 NOTE — TELEPHONE ENCOUNTER
DOMINICK Parkinson needed an order for the procedure on 5/28/24 here in the [place] IR office .  Is this the correct one?

## 2024-05-29 ENCOUNTER — TELEPHONE (OUTPATIENT)
Dept: VASCULAR SURGERY | Age: 64
End: 2024-05-29

## 2024-05-29 ENCOUNTER — HOSPITAL ENCOUNTER (OUTPATIENT)
Age: 64
Setting detail: OUTPATIENT SURGERY
Discharge: HOME | End: 2024-05-29
Attending: SURGERY | Admitting: SURGERY
Payer: COMMERCIAL

## 2024-05-29 VITALS
SYSTOLIC BLOOD PRESSURE: 160 MMHG | RESPIRATION RATE: 16 BRPM | DIASTOLIC BLOOD PRESSURE: 93 MMHG | HEART RATE: 70 BPM | OXYGEN SATURATION: 97 % | TEMPERATURE: 97.1 F

## 2024-05-29 DIAGNOSIS — I70.235 ATHSCL NATIVE ARTERIES OF RIGHT LEG W ULCER OTH PRT FOOT (HCC): Primary | ICD-10-CM

## 2024-05-29 DIAGNOSIS — I73.9 PERIPHERAL VASCULAR DISEASE, UNSPECIFIED (HCC): ICD-10-CM

## 2024-05-29 DIAGNOSIS — I99.8 VENOUS COLLATERAL CIRCULATION, ANY SITE: ICD-10-CM

## 2024-05-29 PROCEDURE — 2709999900 HC NON-CHARGEABLE SUPPLY: Performed by: SURGERY

## 2024-05-29 PROCEDURE — 75774 ARTERY X-RAY EACH VESSEL: CPT | Performed by: SURGERY

## 2024-05-29 PROCEDURE — 75710 ARTERY X-RAYS ARM/LEG: CPT | Performed by: SURGERY

## 2024-05-29 PROCEDURE — 6360000002 HC RX W HCPCS: Performed by: SURGERY

## 2024-05-29 PROCEDURE — C1769 GUIDE WIRE: HCPCS | Performed by: SURGERY

## 2024-05-29 PROCEDURE — 6370000000 HC RX 637 (ALT 250 FOR IP): Performed by: SURGERY

## 2024-05-29 PROCEDURE — C1894 INTRO/SHEATH, NON-LASER: HCPCS | Performed by: SURGERY

## 2024-05-29 PROCEDURE — 6360000004 HC RX CONTRAST MEDICATION: Performed by: SURGERY

## 2024-05-29 PROCEDURE — 99153 MOD SED SAME PHYS/QHP EA: CPT | Performed by: SURGERY

## 2024-05-29 PROCEDURE — 7100000011 HC PHASE II RECOVERY - ADDTL 15 MIN: Performed by: SURGERY

## 2024-05-29 PROCEDURE — 7100000010 HC PHASE II RECOVERY - FIRST 15 MIN: Performed by: SURGERY

## 2024-05-29 PROCEDURE — 2500000003 HC RX 250 WO HCPCS: Performed by: SURGERY

## 2024-05-29 PROCEDURE — 36247 INS CATH ABD/L-EXT ART 3RD: CPT | Performed by: SURGERY

## 2024-05-29 PROCEDURE — 99152 MOD SED SAME PHYS/QHP 5/>YRS: CPT | Performed by: SURGERY

## 2024-05-29 RX ORDER — MIDAZOLAM HYDROCHLORIDE 1 MG/ML
INJECTION INTRAMUSCULAR; INTRAVENOUS PRN
Status: DISCONTINUED | OUTPATIENT
Start: 2024-05-29 | End: 2024-05-29 | Stop reason: HOSPADM

## 2024-05-29 RX ORDER — HYDRALAZINE HYDROCHLORIDE 20 MG/ML
INJECTION INTRAMUSCULAR; INTRAVENOUS PRN
Status: DISCONTINUED | OUTPATIENT
Start: 2024-05-29 | End: 2024-05-29 | Stop reason: HOSPADM

## 2024-05-29 RX ORDER — ACETAMINOPHEN 325 MG/1
650 TABLET ORAL EVERY 4 HOURS PRN
Status: DISCONTINUED | OUTPATIENT
Start: 2024-05-29 | End: 2024-05-29 | Stop reason: HOSPADM

## 2024-05-29 RX ORDER — LIDOCAINE HYDROCHLORIDE 10 MG/ML
INJECTION, SOLUTION INFILTRATION; PERINEURAL PRN
Status: DISCONTINUED | OUTPATIENT
Start: 2024-05-29 | End: 2024-05-29 | Stop reason: HOSPADM

## 2024-05-29 RX ORDER — IBUPROFEN 800 MG/1
800 TABLET ORAL EVERY 6 HOURS PRN
Status: DISCONTINUED | OUTPATIENT
Start: 2024-05-29 | End: 2024-05-29 | Stop reason: HOSPADM

## 2024-05-29 RX ADMIN — IBUPROFEN 800 MG: 800 TABLET, FILM COATED ORAL at 15:19

## 2024-05-29 ASSESSMENT — PAIN DESCRIPTION - LOCATION: LOCATION: FOOT

## 2024-05-29 ASSESSMENT — PAIN DESCRIPTION - DESCRIPTORS: DESCRIPTORS: ACHING

## 2024-05-29 ASSESSMENT — PAIN - FUNCTIONAL ASSESSMENT: PAIN_FUNCTIONAL_ASSESSMENT: PREVENTS OR INTERFERES SOME ACTIVE ACTIVITIES AND ADLS

## 2024-05-29 ASSESSMENT — PAIN SCALES - GENERAL: PAINLEVEL_OUTOF10: 4

## 2024-05-29 ASSESSMENT — PAIN DESCRIPTION - ORIENTATION: ORIENTATION: RIGHT

## 2024-05-29 NOTE — INTERVAL H&P NOTE
Update History & Physical    The patient's History and Physical of May 22,  was reviewed with the patient and I examined the patient. There was no change. The surgical site was confirmed by the patient and me.     Plan: The risks, benefits, expected outcome, and alternative to the recommended procedure have been discussed with the patient. Patient understands and wants to proceed with the procedure.     Electronically signed by Evaristo Enciso MD on 5/29/2024 at 11:54 AM

## 2024-05-29 NOTE — TELEPHONE ENCOUNTER
Spoke with Dr. Enciso, and he sent a script in.     [facility] called over and asks if you can please send a prescription for patient Eliquis to his pharmacy, he is out of refills.

## 2024-05-29 NOTE — PROGRESS NOTES
Patient swung legs over edge of bed, no new bleeding noted. Patients wife assisted in dressing patient before discharging.

## 2024-05-29 NOTE — PROGRESS NOTES
All discharge instructions given with wife at bedside. All questions answered at this time. All belongings returned.

## 2024-05-29 NOTE — OP NOTE
Operative Note      Patient: Crispin Marcum  YOB: 1960  MRN: [id number]    Date of Procedure: 5/29/2024    Preop diagnosis:  Atherosclerotic disease with rest pain and ulceration right lower extremity    Post-Op Diagnosis: Same       Procedure:  1.  Left common femoral arterial access under ultrasound guidance.  2.  Placement of catheter into right common iliac artery with right common iliac external iliac hypogastric and common femoral profundofemoral arteries.  3.  Placement of catheter into right common femoral artery with right common femoral profundofemoral SFA popliteal and tibioperoneal arteriography.    Surgeon(s):  Evaristo Enciso MD    Assistant:   None    Anesthesia: 3 mg Versed, 50 mcg fentanyl, 10 mg hydralazine    Estimated Blood Loss (mL): Minimal    Complications: None    Specimens:   * No specimens in log *    Implants:  * No implants in log *      Drains: * No LDAs found *    Findings:  Infection Present At Time Of Surgery (PATOS) (choose all levels that have infection present):  No infection present  Other Findings: The iliac system on the right as well as the right common femoral and profundofemoral arteries are patent without significant stenosis.  The endarterectomy on the right is patent without problems.  The SFA is occluded and remains occluded down to the mid popliteal artery.  The popliteal reconstitutes distally and has single-vessel outflow via the peroneal artery.    Detailed Description of Procedure:   The patient was brought to the operating room and placed in a supine position with his arms tucked at his sides.  He was identified as Crispin Marcum for right lower extremity arteriogram and possible intervention.  He was given light sedation with 2 of Versed and 50 mcg of fentanyl.  An additional milligram of Versed was given later on for blood pressure issues.  His groins were prepped and draped in sterile fashion.  Timeout was held before lidocaine without epinephrine

## 2024-05-29 NOTE — DISCHARGE INSTRUCTIONS
Angiogram: What to Expect at Home  Angiogram with or without vascular stent placement is a procedure to open a narrowed artery in the lower body.  Fatty buildup (plaque) can narrow or block these arteries. When one or more of your arteries are narrowed, it can make it hard for blood to flow to the body. This procedure may improve blood flow and reduce risk of loss of the limb.  Your groin may have a bruise or a small lump where the catheter was put in (catheter site). The area may feel sore and the bruise may get bigger for a few days after the procedure.   This care sheet gives you a general idea about how long it will take for you to recover. Follow the steps below to feel better as quickly as possible.  How can you care for yourself at home?  Sedation  No alcoholic beverages for 24 hours after the procedure.   The sedative will make you sleepy. Rest until the effects have worn off.   Nausea or vomiting from the sedative is normal and usually does not last long.  Ask your doctor when you will be able to return to work.   Do not drive, operate machinery, do anything that requires attention to detail, or sign important papers for at least 24 hours or until your doctor says it is safe.   Activity  Rest when you feel tired. Getting enough sleep will help you recover.  Try to walk each day. Start by walking a little more than you did the day before.  Walking boosts blood flow and helps prevent pneumonia and constipation.  Try not to walk up stairs for the first couple of days until the catheter site heals.  Avoid strenuous activities, such as bicycle riding, jogging, weight lifting, or aerobic exercise, for 2 weeks or until your doctor says it is okay.  Avoid heavy lifting for 1 week. Lift nothing over 10 pounds (a gallon of milk is 8 pounds).  Ask your doctor when you can drive again.  You will probably need to take 1 to 2 weeks off from work. It depends on the type of work you do and how you feel.  You may shower 24

## 2024-05-29 NOTE — PROGRESS NOTES
Dr. Enciso called to report patients pain in right leg/foot. Verbal orders taken for pain medication.

## 2024-05-31 ENCOUNTER — TELEPHONE (OUTPATIENT)
Dept: INTERVENTIONAL RADIOLOGY/VASCULAR | Age: 64
End: 2024-05-31

## 2024-05-31 NOTE — TELEPHONE ENCOUNTER
Post-procedure phone call placed to patient. No answer; voicemail left with phone number to call for any questions regarding recent angiogram.  
General

## 2024-06-26 ENCOUNTER — OFFICE VISIT (OUTPATIENT)
Dept: VASCULAR SURGERY | Age: 64
End: 2024-06-26
Payer: COMMERCIAL

## 2024-06-26 VITALS
HEART RATE: 71 BPM | HEIGHT: 72 IN | TEMPERATURE: 97.1 F | DIASTOLIC BLOOD PRESSURE: 85 MMHG | RESPIRATION RATE: 16 BRPM | WEIGHT: 242.1 LBS | BODY MASS INDEX: 32.79 KG/M2 | SYSTOLIC BLOOD PRESSURE: 129 MMHG

## 2024-06-26 DIAGNOSIS — I70.235 ATHEROSCLEROSIS OF NATIVE ARTERIES OF RIGHT LEG WITH ULCERATION OF OTHER PART OF FOOT (HCC): Primary | ICD-10-CM

## 2024-06-26 PROCEDURE — 1036F TOBACCO NON-USER: CPT | Performed by: SURGERY

## 2024-06-26 PROCEDURE — G8427 DOCREV CUR MEDS BY ELIG CLIN: HCPCS | Performed by: SURGERY

## 2024-06-26 PROCEDURE — 99213 OFFICE O/P EST LOW 20 MIN: CPT | Performed by: SURGERY

## 2024-06-26 PROCEDURE — G8417 CALC BMI ABV UP PARAM F/U: HCPCS | Performed by: SURGERY

## 2024-06-26 PROCEDURE — 3017F COLORECTAL CA SCREEN DOC REV: CPT | Performed by: SURGERY

## 2024-06-26 NOTE — PATIENT INSTRUCTIONS
SURVEY:    You may be receiving a survey from [employer] regarding your visit today.    Please complete the survey to enable us to provide the highest quality of care to you and your family.    If you cannot score us a very good on any question, please call the office to discuss how we could have made your experience a very good one.    Thank you.      Please recheck your blood pressure when you go home and make sure you take your prescribed medication if applicable .  Please follow up with your PCP or ER if needed.

## 2024-06-27 NOTE — PROGRESS NOTES
[facility], [facility] VASCULAR PART OF [address]  [facility] [address]  Dept: [phone]     Patient: Crispin Marcum  : 1960  MRN: [id number]  DOS: 2024            HPI:  Crispin Marcum is a 63 y.o. male who returns to the office regarding his right lower extremity.  Recall that he has had bypass in the past as well as intervention.  He has an occluded SFA on the right with reconstitution of the distal popliteal artery and single-vessel outflow through the peroneal artery to the foot.  He has a transmetatarsal amputation also on the same extremity with some ulcerations.  His ulcerations he thinks and is quite convinced are getting better with less surface area.  He still has foot pain.  We had performed arteriography approximately a month and a half ago.  No intervention was undertaken as all of the previous interventions have failed.    Review of Systems    Vitals:    24 1105 24 1111   BP: (!) 147/86 129/85   Pulse: 71 71   Resp: 16    Temp: 97.1 °F (36.2 °C)    Weight: 109.8 kg (242 lb 1.6 oz)    Height: 1.829 m (6')           Physical Exam  On examination his ulcers do have some improvements.  His foot is still with dependent rubor and pain.  He has no new ulceration.  He has a femoral pulse but no popliteal or distal pulses.  The contralateral lower extremity is normal with good distal pulses.  Assessment:  1. Atherosclerosis of native arteries of right leg with ulceration of other part of foot (HCC)          Plan:  At this point he is content as MI with watchful waiting.  He does not want further intervention.  A bypass would likely need to be with spliced vein or prosthetic.  This could be done to the distal popliteal artery.  It is a redo situation both at the groin and the popliteal fossa.  Currently he is healing and I think if he heals on his own I would rather have those

## 2024-09-03 ENCOUNTER — HOSPITAL ENCOUNTER (OUTPATIENT)
Age: 64
Discharge: HOME | End: 2024-09-03
Payer: COMMERCIAL

## 2024-09-03 DIAGNOSIS — C61 PROSTATE CANCER (HCC): ICD-10-CM

## 2024-09-03 PROCEDURE — 36415 COLL VENOUS BLD VENIPUNCTURE: CPT

## 2024-09-03 PROCEDURE — 84153 ASSAY OF PSA TOTAL: CPT

## 2024-09-04 LAB — PSA SERPL-MCNC: 6.3 NG/ML (ref 0–4)

## 2024-09-05 ENCOUNTER — OFFICE VISIT (OUTPATIENT)
Dept: UROLOGY | Age: 64
End: 2024-09-05
Payer: COMMERCIAL

## 2024-09-05 VITALS
TEMPERATURE: 98 F | SYSTOLIC BLOOD PRESSURE: 138 MMHG | WEIGHT: 239 LBS | DIASTOLIC BLOOD PRESSURE: 80 MMHG | BODY MASS INDEX: 32.41 KG/M2

## 2024-09-05 DIAGNOSIS — C61 PROSTATE CANCER (HCC): Primary | ICD-10-CM

## 2024-09-05 DIAGNOSIS — R97.20 ELEVATED PSA: ICD-10-CM

## 2024-09-05 PROCEDURE — 99213 OFFICE O/P EST LOW 20 MIN: CPT | Performed by: NURSE PRACTITIONER

## 2024-09-05 RX ORDER — ACETAMINOPHEN 650 MG/1
650 SUPPOSITORY RECTAL EVERY 8 HOURS PRN
COMMUNITY

## 2024-09-05 RX ORDER — IBUPROFEN 800 MG/1
800 TABLET, FILM COATED ORAL EVERY 6 HOURS PRN
COMMUNITY

## 2024-09-05 NOTE — PROGRESS NOTES
History  3/2018 Referral from Dr. Rodriguez for elevated PSA. No family history of prostate cancer, breast cancer or ovarian cancer     4/2018 Prostate biopsy for PSA of 5.54. Pathology: Benign prostate tissue in all 12 cores     PSA  9/2024 - 6.30  2/2024 - 8.06  6/2023 - 9.69  3/2023 - 9.31  12/2022 - 12.29  8/2022 - 12.35  5/2022 - 11.69  1/2022 - 11.6  12/2021 - 10.8  2/2018 - 5.54  1/2018 - 5.25  4/2017 - 4.14     1/2022 prostate MRI showed a PI-RADS 3 lesion in the right posterior medial peripheral zone     5/2022 Prostate biopsy for PSA of 11.69. Pathology: LB 3+3=6, 8%. RB PIN. Grade group IIA, favorable intermediate risk group    2/2024  restaging biopsy, PSA 8.06. Pathology: benign prostatic tissue in all cores    Today  Here today to follow-up for prostate cancer.  Most recent PSA is 6.30.  This has declined from prior PSA. He denies unintentional weight loss, decreased energy or appetite, new or worsening bone/hip/back pain. He denies any lower extremity numbness and tingling. He denies new or worsening LUTS. He denies gross hematuria or dysuria.     Plan  PSA in 6 months, we will call with results    F/U in 1 year with a PSA prior

## 2024-09-25 DIAGNOSIS — I70.203 ATHEROSCLEROSIS OF NATIVE ARTERY OF BOTH LOWER EXTREMITIES, WITH UNSPECIFIED PRESENCE OF CLINICAL MANIFESTATION (HCC): Primary | ICD-10-CM

## 2024-10-23 ENCOUNTER — OFFICE VISIT (OUTPATIENT)
Dept: VASCULAR SURGERY | Age: 64
End: 2024-10-23
Payer: COMMERCIAL

## 2024-10-23 VITALS
TEMPERATURE: 97.1 F | DIASTOLIC BLOOD PRESSURE: 86 MMHG | RESPIRATION RATE: 16 BRPM | WEIGHT: 258 LBS | HEIGHT: 69 IN | BODY MASS INDEX: 38.21 KG/M2 | SYSTOLIC BLOOD PRESSURE: 161 MMHG | HEART RATE: 77 BPM

## 2024-10-23 DIAGNOSIS — I70.235 ATHEROSCLEROSIS OF NATIVE ARTERIES OF RIGHT LEG WITH ULCERATION OF OTHER PART OF FOOT (HCC): Primary | ICD-10-CM

## 2024-10-23 PROCEDURE — 99213 OFFICE O/P EST LOW 20 MIN: CPT | Performed by: SURGERY

## 2024-10-23 NOTE — PROGRESS NOTES
[facility], [facility] VASCULAR PART OF [address]  [facility] [address]  Dept: [phone]     Patient: Crispin Marcum  : 1960  MRN: [id number]  DOS: 10/23/2024            HPI:  Crispin Marcum is a 64 y.o. male who returns to the office regarding his lower extremity arterial disease and ulceration.  Recall that he has a right lateral amputation site ulceration on the foot.    Review of Systems    Vitals:    10/23/24 1106 10/23/24 1109   BP: (!) 163/96 (!) 161/86   Pulse: 76 77   Resp: 16    Temp: 97.1 °F (36.2 °C)    Weight: 117 kg (258 lb)    Height: 1.753 m (5' 9\")           Physical Exam  On examination he has old scabs in that location of the lateral amputation site.  I removed the scabs and there is healed skin underneath completely.  There is no further ulcer at that location.  He does have a plantar ulcer on the lateral aspect of the foot which is not terribly severe.  It is not infected.  His foot is warm and adequately perfused.  He feels better than he has in the past.    Assessment:  1. Atherosclerosis of native arteries of right leg with ulceration of other part of foot (HCC)          Plan:  We will continue to follow his ulcerations that are seeming to heal quite well at 3-month intervals.  He understands that if he has problems in the meantime he is certainly welcome back.    Electronically signed by:  Evaristo Enciso MD

## 2025-01-29 ENCOUNTER — OFFICE VISIT (OUTPATIENT)
Dept: VASCULAR SURGERY | Age: 65
End: 2025-01-29
Payer: COMMERCIAL

## 2025-01-29 VITALS
WEIGHT: 284.7 LBS | SYSTOLIC BLOOD PRESSURE: 139 MMHG | RESPIRATION RATE: 20 BRPM | TEMPERATURE: 96.9 F | BODY MASS INDEX: 38.56 KG/M2 | HEART RATE: 78 BPM | HEIGHT: 72 IN | DIASTOLIC BLOOD PRESSURE: 91 MMHG

## 2025-01-29 DIAGNOSIS — I70.213 ATHEROSCLER OF NATIVE ARTERY OF BOTH LEGS WITH INTERMIT CLAUDICATION (HCC): Primary | ICD-10-CM

## 2025-01-29 DIAGNOSIS — I70.235 ATHEROSCLEROSIS OF NATIVE ARTERIES OF RIGHT LEG WITH ULCERATION OF OTHER PART OF FOOT (HCC): ICD-10-CM

## 2025-01-29 PROCEDURE — 99213 OFFICE O/P EST LOW 20 MIN: CPT | Performed by: SURGERY

## 2025-01-29 RX ORDER — ACETAMINOPHEN 325 MG/1
650 TABLET ORAL EVERY 6 HOURS PRN
COMMUNITY

## 2025-01-29 NOTE — PROGRESS NOTES
[facility], [facility] VASCULAR PART OF [address]  [facility] [address]  Dept: [phone]     Patient: Crispin Marcum  : 1960  MRN: [id number]  DOS: 2025            HPI:  Crispin Marcum is a 64 y.o. male who returns to the office regarding his lower extremity arterial disease.  Recall that he has right lower extremity arterial disease which has been worked on in the past.  He had a TMA on that side and has 1 further persistent ulcer on the right within the TMA incision.  It is not large and does not pose a limb salvage issue at this point in time.  He has been dealing with ulcers on that lower extremity for many many years.  They come and go and always seem to heal.  He walks infrequently at this point and gets around with a wheelchair quite well.  He does walk with a cane but has some neurologic problems with radiculopathy in both lower extremities which prevents him from walking very far.    Review of Systems    Vitals:    25 1147 25 1153   BP: (!) 159/96 (!) 139/91   Site: Left Upper Arm Left Upper Arm   Position: Sitting Sitting   Cuff Size: Large Adult Large Adult   Pulse: 72 78   Resp: 20    Temp: 96.9 °F (36.1 °C)    TempSrc: Temporal    Weight: 129.1 kg (284 lb 11.2 oz)    Height: 1.829 m (6')           Physical Exam  On examination the ulcer measures approximately 0.5 cm in its greatest diameter.  It is healthy in its base with good granulation tissue.  I see no exposed bone.  The surrounding tissue is without erythema or induration.  There is no purulence.  This is all on the right side.  The left is normal.    Assessment:  1. Atheroscler of native artery of both legs with intermit claudication (HCC)    2. Atherosclerosis of native arteries of right leg with ulceration of other part of foot (HCC)          Plan:  At this time I would continue to follow him at 6-month intervals with

## 2025-01-29 NOTE — PATIENT INSTRUCTIONS
SURVEY:    Thank you for allowing us to care for you today.    You may be receiving a survey from [facility] regarding your visit today- electronically or via mail.      Please help us by completing the survey as this will provide the needed feedback to ensure we are providing the very best care for you and your family.    If you cannot score us a very good on any question, please call the office to discuss how we could have made your experience a very good one.    Thank you.       STAFF:    Evita Billy, Diana WEINER      CLINICAL STAFF:    Nidia ANDREA, Sue WEINER, Elin WEINER, Kelly ANDREA

## 2025-02-27 DIAGNOSIS — I70.203 ATHEROSCLEROSIS OF NATIVE ARTERY OF BOTH LOWER EXTREMITIES, WITH UNSPECIFIED PRESENCE OF CLINICAL MANIFESTATION: ICD-10-CM

## 2025-02-27 NOTE — TELEPHONE ENCOUNTER
Roma called in and left a voicemail that Crispin is in need of a refill of his Eliquis. Next office visit is 7/23/25. Please advise.

## 2025-02-28 NOTE — TELEPHONE ENCOUNTER
Spouse notified. She requested information on [employer]. I gave her information on [employer] website to access copay card/eligibility and she is going to  an application from out  for [employer] program.

## 2025-03-11 ENCOUNTER — TELEPHONE (OUTPATIENT)
Dept: VASCULAR SURGERY | Age: 65
End: 2025-03-11

## 2025-03-11 NOTE — TELEPHONE ENCOUNTER
Pt's spouse called stating she has a copay discount card for Eliquis but [facility] does not accept it. I called [facility] and they stated OOP cost for Eliquis is $1,000 and they confirmed they do not accept discount cards. I called [facility] pharmacy (pt's preferred local pharmacy) and spoke with pharmacist, she stated she can contact [facility] and request the order to be transferred to them for patient to  as retail with copay card.

## 2025-03-26 NOTE — DISCHARGE INSTR - COC
Continuity of Care Form    Patient Name: Crispin Marcum   :  1960  MRN:  [id number]    Admit date:  4/15/2024  Discharge date:  2024    Code Status Order: Full Code   Advance Directives:     Admitting Physician:  Jackie Collins MD  PCP: Gio Rodriguez DO    Discharging Nurse: Adriana Pryor RN  Discharging Hospital Unit/Room#: 1021/1021-01  Discharging Unit Phone Number: [phone]    Emergency Contact:   Extended Emergency Contact Information  Primary Emergency Contact: Roma Marcum  Address: [address] [place] [address]  Home Phone: [phone]  Relation: Spouse  Hearing or visual needs: None  Other needs: None  Preferred language: English   needed? No  Secondary Emergency Contact: Pratik Marcum  Mobile Phone: [phone]  Relation: Child    Past Surgical History:  Past Surgical History:   Procedure Laterality Date    FEMORAL BYPASS  2016    FEMORAL BYPASS Right 2024    LYSIS CHECK , RIGHT FEMORAL ENDARTERECTOMY, CHRISTIANO, COMMON FEMORAL JUMP BYPASS WITH ANGIOGRAM performed by Kiarra Marin MD at [facility]    FOOT AMPUTATION THROUGH METATARSAL      times two    SYMPATHECTOMY      VASCULAR SURGERY Right 4/15/2024    RIGHT LEG ANGIOGRAM, LYSIS CATHETER PLACEMENT performed by Kiarra Marin MD at [facility]       Immunization History:   Immunization History   Administered Date(s) Administered    COVID-19, MODERNA, (- formula), (age 12y+), IM, 50mcg/0.5mL 01/10/2024    COVID-19, PFIZER Bivalent, DO NOT Dilute, (age 12y+), IM, 30 mcg/0.3 mL 10/19/2022       Active Problems:  Patient Active Problem List   Diagnosis Code    Chronic ulcer of right midfoot limited to breakdown of skin ([facility]) L97.411    Bacterial infection due to Pseudomonas A49.8    Elevated PSA R97.20    Arterial occlusion I70.90    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 Awaiting response at this time.    Tobacco abuse Z72.0    Suspected sleep apnea R29.818    Chronic bronchitis ([facility]) J42       Isolation/Infection:   Isolation            No Isolation          Patient Infection Status       None to display            Nurse Assessment:  Last Vital Signs: BP (!) 141/78   Pulse 87   Temp 99.1 °F (37.3 °C) (Oral)   Resp 21   Wt 118.6 kg (261 lb 7.5 oz)   SpO2 94%   BMI 35.46 kg/m²     Last documented pain score (0-10 scale): Pain Level: 8  Last Weight:   Wt Readings from Last 1 Encounters:   04/18/24 118.6 kg (261 lb 7.5 oz)     Mental Status:  oriented and alert    IV Access:  - None    Nursing Mobility/ADLs:  Walking   Assisted  Transfer  Assisted  Bathing  Assisted  Dressing  Assisted  Toileting  Assisted  Feeding  Independent  Med Admin  Independent  Med Delivery   whole    Wound Care Documentation and Therapy:  Puncture 04/15/24 Pelvis (Active)   Wound Assessment Other (Comment) 04/18/24 0800   Pearl-wound Assessment Other (Comment) 04/18/24 0800   Closure Other (Comment) 04/18/24 0800   Drainage Amount Scant 04/18/24 0800   Drainage Description Serosanguinous 04/18/24 0800   Odor None 04/18/24 0800   Dressing/Treatment Gauze dressing/dressing sponge 04/18/24 0800   Dressing Status Clean, dry & intact 04/18/24 0800   Dressing/Treatment Other (comment) 04/18/24 0800   Number of days: 2       Wound 07/06/17 Foot Lateral;Right #1 right lateral foot (Active)   Number of days: 2478       Incision 04/16/24 Groin Proximal;Right (Active)   Dressing Status Clean;Dry;Intact 04/18/24 0800   Incision Cleansed Not Cleansed 04/18/24 0800   Dressing/Treatment Pressure dressing;Other (comment) 04/18/24 0800   Margins Other (Comment) 04/18/24 0400   Incision Assessment Other (Comment) 04/18/24 0400   Drainage Amount None (dry) 04/18/24 0400   Odor None 04/18/24 0400   Pearl-incision Assessment Other (Comment) 04/18/24 0400   Number of days: 1       Incision 04/16/24 Right (Active)   Dressing Status Clean;Dry;Intact 04/18/24 0400

## 2025-06-25 ENCOUNTER — OFFICE VISIT (OUTPATIENT)
Dept: SURGERY | Age: 65
End: 2025-06-25
Payer: COMMERCIAL

## 2025-06-25 DIAGNOSIS — K43.9 HERNIA OF ABDOMINAL WALL: ICD-10-CM

## 2025-06-25 DIAGNOSIS — R10.30 DISCOMFORT OF GROIN, UNSPECIFIED LATERALITY: Primary | ICD-10-CM

## 2025-06-25 PROBLEM — I77.9 PERIPHERAL ARTERIAL OCCLUSIVE DISEASE: Status: ACTIVE | Noted: 2025-06-25

## 2025-06-25 PROBLEM — E78.5 HYPERLIPIDEMIA: Status: ACTIVE | Noted: 2025-06-25

## 2025-06-25 PROCEDURE — 99203 OFFICE O/P NEW LOW 30 MIN: CPT | Performed by: SURGERY

## 2025-06-25 NOTE — PROGRESS NOTES
SYMPATHECTOMY      VASCULAR SURGERY Right 04/15/2024    RIGHT LEG ANGIOGRAM, LYSIS CATHETER PLACEMENT performed by Kiarra Marin MD at [facility]       Social History:  reports that he quit smoking about 8 years ago. His smoking use included cigarettes. He has never used smokeless tobacco. He reports that he does not currently use drugs. He reports that he does not drink alcohol.    Family History: family history includes Cancer in his mother.    Review of Systems:   General: Completed and, except as mentioned above, was negative or noncontributory  Psychological:  Completed and, except as mentioned above, was negative or noncontributory  Ophthalmic:  Completed and, except as mentioned above, was negative or noncontributory  ENT:  Completed and, except as mentioned above, was negative or noncontributory  Allergy and Immunology:  Completed and, except as mentioned above, was negative or noncontributory  Hematological and Lymphatic:  Completed and, except as mentioned above, was negative or noncontributory  Endocrine: Completed and, except as mentioned above, was negative or noncontributory  Breast:  Completed and, except as mentioned above, was negative or noncontributory  Respiratory:  Completed and, except as mentioned above, was negative or noncontributory  Cardiovascular:  Completed and, except as mentioned above, was negative or noncontributory  Gastrointestinal: Completed and, except as mentioned above, was negative or noncontributory  Genito-Urinary:  Completed and, except as mentioned above, was negative or noncontributory  Musculoskeletal:  Completed and, except as mentioned above, was negative or noncontributory  Neurological:  Completed and, except as mentioned above, was negative or noncontributory  Dermatological:  Completed and, except as mentioned above, was negative or noncontributory    Allergies: Patient has no known allergies.    Current Meds:  Current Outpatient Medications:     apixaban

## 2025-07-07 VITALS — HEART RATE: 77 BPM | OXYGEN SATURATION: 97 % | RESPIRATION RATE: 17 BRPM

## 2025-08-18 ENCOUNTER — OFFICE VISIT (OUTPATIENT)
Dept: VASCULAR SURGERY | Age: 65
End: 2025-08-18
Payer: MEDICARE

## 2025-08-18 ENCOUNTER — OFFICE VISIT (OUTPATIENT)
Dept: UROLOGY | Age: 65
End: 2025-08-18
Payer: MEDICARE

## 2025-08-18 VITALS
HEIGHT: 72 IN | SYSTOLIC BLOOD PRESSURE: 171 MMHG | DIASTOLIC BLOOD PRESSURE: 97 MMHG | BODY MASS INDEX: 39.6 KG/M2 | WEIGHT: 292.4 LBS | TEMPERATURE: 98 F | HEART RATE: 108 BPM | RESPIRATION RATE: 18 BRPM

## 2025-08-18 VITALS
BODY MASS INDEX: 39.6 KG/M2 | HEART RATE: 109 BPM | TEMPERATURE: 97.7 F | DIASTOLIC BLOOD PRESSURE: 88 MMHG | SYSTOLIC BLOOD PRESSURE: 148 MMHG | WEIGHT: 292 LBS

## 2025-08-18 DIAGNOSIS — I70.213 ATHEROSCLER OF NATIVE ARTERY OF BOTH LEGS WITH INTERMIT CLAUDICATION: Primary | ICD-10-CM

## 2025-08-18 DIAGNOSIS — N45.2 ORCHITIS OF LEFT TESTICLE: Primary | ICD-10-CM

## 2025-08-18 DIAGNOSIS — N50.812 LEFT TESTICULAR PAIN: ICD-10-CM

## 2025-08-18 DIAGNOSIS — I71.43 INFRARENAL ABDOMINAL AORTIC ANEURYSM (AAA) WITHOUT RUPTURE: ICD-10-CM

## 2025-08-18 PROCEDURE — 1123F ACP DISCUSS/DSCN MKR DOCD: CPT | Performed by: PHYSICIAN ASSISTANT

## 2025-08-18 PROCEDURE — 99212 OFFICE O/P EST SF 10 MIN: CPT | Performed by: SURGERY

## 2025-08-18 PROCEDURE — 99213 OFFICE O/P EST LOW 20 MIN: CPT | Performed by: SURGERY

## 2025-08-18 PROCEDURE — G8428 CUR MEDS NOT DOCUMENT: HCPCS | Performed by: PHYSICIAN ASSISTANT

## 2025-08-18 PROCEDURE — 1123F ACP DISCUSS/DSCN MKR DOCD: CPT | Performed by: SURGERY

## 2025-08-18 PROCEDURE — 1036F TOBACCO NON-USER: CPT | Performed by: PHYSICIAN ASSISTANT

## 2025-08-18 PROCEDURE — G8417 CALC BMI ABV UP PARAM F/U: HCPCS | Performed by: SURGERY

## 2025-08-18 PROCEDURE — G8427 DOCREV CUR MEDS BY ELIG CLIN: HCPCS | Performed by: SURGERY

## 2025-08-18 PROCEDURE — G8417 CALC BMI ABV UP PARAM F/U: HCPCS | Performed by: PHYSICIAN ASSISTANT

## 2025-08-18 PROCEDURE — 3017F COLORECTAL CA SCREEN DOC REV: CPT | Performed by: SURGERY

## 2025-08-18 PROCEDURE — 3017F COLORECTAL CA SCREEN DOC REV: CPT | Performed by: PHYSICIAN ASSISTANT

## 2025-08-18 PROCEDURE — 99214 OFFICE O/P EST MOD 30 MIN: CPT | Performed by: PHYSICIAN ASSISTANT

## 2025-08-18 PROCEDURE — 1036F TOBACCO NON-USER: CPT | Performed by: SURGERY

## 2025-08-18 RX ORDER — SULFAMETHOXAZOLE AND TRIMETHOPRIM 800; 160 MG/1; MG/1
1 TABLET ORAL 2 TIMES DAILY
Qty: 20 TABLET | Refills: 0 | Status: SHIPPED | OUTPATIENT
Start: 2025-08-18 | End: 2025-08-28

## 2025-08-18 RX ORDER — TRAMADOL HYDROCHLORIDE 50 MG/1
50 TABLET ORAL EVERY 8 HOURS PRN
Qty: 16 TABLET | Refills: 0 | Status: SHIPPED | OUTPATIENT
Start: 2025-08-18 | End: 2025-08-23

## 2025-08-19 ENCOUNTER — HOSPITAL ENCOUNTER (OUTPATIENT)
Age: 65
Setting detail: SPECIMEN
Discharge: HOME | End: 2025-08-19
Payer: MEDICARE

## 2025-08-19 DIAGNOSIS — N50.812 LEFT TESTICULAR PAIN: ICD-10-CM

## 2025-08-19 PROCEDURE — 87086 URINE CULTURE/COLONY COUNT: CPT

## 2025-08-20 ENCOUNTER — RESULTS FOLLOW-UP (OUTPATIENT)
Dept: UROLOGY | Age: 65
End: 2025-08-20

## 2025-08-20 LAB
MICROORGANISM SPEC CULT: NO GROWTH
SERVICE CMNT-IMP: NORMAL
SPECIMEN DESCRIPTION: NORMAL

## 2025-08-28 ENCOUNTER — HOSPITAL ENCOUNTER (OUTPATIENT)
Dept: LAB | Age: 65
Discharge: HOME | End: 2025-08-28
Payer: MEDICARE

## 2025-08-28 ENCOUNTER — HOSPITAL ENCOUNTER (OUTPATIENT)
Dept: ULTRASOUND IMAGING | Age: 65
Discharge: HOME | End: 2025-08-30
Payer: MEDICARE

## 2025-08-28 DIAGNOSIS — R97.20 ELEVATED PSA: ICD-10-CM

## 2025-08-28 DIAGNOSIS — C61 PROSTATE CANCER (HCC): ICD-10-CM

## 2025-08-28 DIAGNOSIS — N50.812 LEFT TESTICULAR PAIN: ICD-10-CM

## 2025-08-28 LAB
ALBUMIN SERPL-MCNC: 4.1 G/DL (ref 3.5–5.2)
ALBUMIN/GLOB SERPL: 1.3 {RATIO} (ref 1–2.5)
ALP SERPL-CCNC: 122 U/L (ref 40–129)
ALT SERPL-CCNC: 36 U/L (ref 10–50)
ANION GAP SERPL CALCULATED.3IONS-SCNC: 14 MMOL/L (ref 9–16)
AST SERPL-CCNC: 26 U/L (ref 10–50)
BASOPHILS # BLD: 0.06 K/UL (ref 0–0.2)
BASOPHILS NFR BLD: 1 % (ref 0–2)
BILIRUB SERPL-MCNC: 0.3 MG/DL (ref 0–1.2)
BUN SERPL-MCNC: 20 MG/DL (ref 8–23)
BUN/CREAT SERPL: 18 (ref 9–20)
CALCIUM SERPL-MCNC: 9.3 MG/DL (ref 8.6–10.4)
CHLORIDE SERPL-SCNC: 107 MMOL/L (ref 98–107)
CHOLEST SERPL-MCNC: 154 MG/DL (ref 0–199)
CHOLESTEROL/HDL RATIO: 5.7
CO2 SERPL-SCNC: 22 MMOL/L (ref 20–31)
CREAT SERPL-MCNC: 1.1 MG/DL (ref 0.7–1.2)
EOSINOPHIL # BLD: 0.11 K/UL (ref 0–0.44)
EOSINOPHILS RELATIVE PERCENT: 1 % (ref 1–4)
ERYTHROCYTE [DISTWIDTH] IN BLOOD BY AUTOMATED COUNT: 13.8 % (ref 11.8–14.4)
GFR, ESTIMATED: 76 ML/MIN/1.73M2
GLUCOSE SERPL-MCNC: 136 MG/DL (ref 74–99)
HCT VFR BLD AUTO: 49.5 % (ref 40.7–50.3)
HDLC SERPL-MCNC: 27 MG/DL
HGB BLD-MCNC: 15.9 G/DL (ref 13–17)
IMM GRANULOCYTES # BLD AUTO: 0.04 K/UL (ref 0–0.3)
IMM GRANULOCYTES NFR BLD: 0 %
LDLC SERPL CALC-MCNC: 68 MG/DL (ref 0–100)
LYMPHOCYTES NFR BLD: 2.35 K/UL (ref 1.1–3.7)
LYMPHOCYTES RELATIVE PERCENT: 23 % (ref 24–43)
MCH RBC QN AUTO: 28.8 PG (ref 25.2–33.5)
MCHC RBC AUTO-ENTMCNC: 32.1 G/DL (ref 28.4–34.8)
MCV RBC AUTO: 89.7 FL (ref 82.6–102.9)
MONOCYTES NFR BLD: 0.83 K/UL (ref 0.1–1.2)
MONOCYTES NFR BLD: 8 % (ref 3–12)
NEUTROPHILS NFR BLD: 67 % (ref 36–65)
NEUTS SEG NFR BLD: 6.79 K/UL (ref 1.5–8.1)
NRBC BLD-RTO: 0 PER 100 WBC
PLATELET # BLD AUTO: 281 K/UL (ref 138–453)
PMV BLD AUTO: 10.1 FL (ref 8.1–13.5)
POTASSIUM SERPL-SCNC: 4.4 MMOL/L (ref 3.7–5.3)
PROT SERPL-MCNC: 7.3 G/DL (ref 6.6–8.7)
PSA SERPL-MCNC: 15.6 NG/ML (ref 0–4)
RBC # BLD AUTO: 5.52 M/UL (ref 4.21–5.77)
SODIUM SERPL-SCNC: 143 MMOL/L (ref 136–145)
TRIGL SERPL-MCNC: 293 MG/DL
VLDLC SERPL CALC-MCNC: 59 MG/DL (ref 1–30)
WBC OTHER # BLD: 10.2 K/UL (ref 3.5–11.3)

## 2025-08-28 PROCEDURE — 93976 VASCULAR STUDY: CPT

## 2025-08-28 PROCEDURE — 85025 COMPLETE CBC W/AUTO DIFF WBC: CPT

## 2025-08-28 PROCEDURE — 80053 COMPREHEN METABOLIC PANEL: CPT

## 2025-08-28 PROCEDURE — 80061 LIPID PANEL: CPT

## 2025-08-28 PROCEDURE — 84153 ASSAY OF PSA TOTAL: CPT

## 2025-08-28 PROCEDURE — 36415 COLL VENOUS BLD VENIPUNCTURE: CPT

## 2025-09-02 ENCOUNTER — HOSPITAL ENCOUNTER (OUTPATIENT)
Age: 65
Setting detail: SPECIMEN
Discharge: HOME | End: 2025-09-02
Payer: MEDICARE

## 2025-09-02 LAB
BACTERIA URNS QL MICRO: ABNORMAL
BILIRUB UR QL STRIP: NEGATIVE
CLARITY UR: CLEAR
COLOR UR: YELLOW
EPI CELLS #/AREA URNS HPF: ABNORMAL /HPF (ref 0–5)
GLUCOSE UR STRIP-MCNC: NEGATIVE MG/DL
HGB UR QL STRIP.AUTO: ABNORMAL
KETONES UR STRIP-MCNC: NEGATIVE MG/DL
LEUKOCYTE ESTERASE UR QL STRIP: NEGATIVE
NITRITE UR QL STRIP: NEGATIVE
PH UR STRIP: 6 [PH] (ref 5–9)
PROT UR STRIP-MCNC: NEGATIVE MG/DL
RBC #/AREA URNS HPF: ABNORMAL /HPF (ref 0–2)
SP GR UR STRIP: >1.03 (ref 1.01–1.02)
UROBILINOGEN UR STRIP-ACNC: NORMAL EU/DL (ref 0–1)
WBC #/AREA URNS HPF: ABNORMAL /HPF (ref 0–5)

## 2025-09-02 PROCEDURE — 81001 URINALYSIS AUTO W/SCOPE: CPT

## (undated) DEVICE — SVMMC VASC MAJ PK

## (undated) DEVICE — NAVICROSS SUPPORT CATHETER: Brand: NAVICROSS

## (undated) DEVICE — SNAP KAP: Brand: UNBRANDED

## (undated) DEVICE — GOWN,AURORA,NONREINFORCED,LARGE: Brand: MEDLINE

## (undated) DEVICE — CATHETER PTCA L130CM BLLN L80MM DIA4MM PACLITAXEL OVR THE

## (undated) DEVICE — GLOVE SURG SZ 8 CRM LTX FREE POLYISOPRENE POLYMER BEAD ANTI

## (undated) DEVICE — GAUZE,SPONGE,FLUFF,6"X6.75",STRL,5/TRAY: Brand: MEDLINE

## (undated) DEVICE — SURGICAL PROCEDURE TRAY CRD CATH SVMMC

## (undated) DEVICE — SUTURE VICRYL + SZ 3-0 L27IN ABSRB UD L26MM SH 1/2 CIR VCP416H

## (undated) DEVICE — DEVICE INFL 20ML 30ATM DGT FLD DISPNS SYR W ACCESSPLUS BLU

## (undated) DEVICE — SHEET, ORTHO, SPLIT, STERILE: Brand: MEDLINE

## (undated) DEVICE — SUTURE PERMAHAND SZ 4-0 L18IN NONABSORBABLE BLK SILK BRAID A183H

## (undated) DEVICE — CATHETER ANGIO 4FR L65CM 0.035IN VIS OMNI FLUSH-0 SFT TIP

## (undated) DEVICE — APPLICATOR MEDICATED 10.5 CC SOLUTION HI LT ORNG CHLORAPREP

## (undated) DEVICE — DEVICE VASC CLSR 5FR GRY W/ INTEGR SEAL 10ML LOK SYR

## (undated) DEVICE — NEEDLE PROC ANGIO 18GAX2 3/4IN

## (undated) DEVICE — FLEXOR, CHECK-FLO, INTRODUCER ANSEL MODIFICATION: Brand: FLEXOR

## (undated) DEVICE — PINNACLE INTRODUCER SHEATH: Brand: PINNACLE

## (undated) DEVICE — TOWEL,OR,DSP,ST,NATURAL,DLX,4/PK,20PK/CS: Brand: MEDLINE

## (undated) DEVICE — COVER,LIGHT HANDLE,FLX,2/PK: Brand: MEDLINE INDUSTRIES, INC.

## (undated) DEVICE — SYRINGE, LUER LOCK, 10ML: Brand: MEDLINE

## (undated) DEVICE — DRAPE,REIN 53X77,STERILE: Brand: MEDLINE

## (undated) DEVICE — SUTURE NONABSORBABLE MONOFILAMENT 6-0 C-1 1X30 IN PROLENE 8706H

## (undated) DEVICE — THE STERILE LIGHT HANDLE COVER IS USED WITH STERIS SURGICAL LIGHTING AND VISUALIZATION SYSTEMS.

## (undated) DEVICE — CATHETER ANGIO 4FR L40CM 0.035IN KMP ANG SEL SFT RADPQ TIP

## (undated) DEVICE — NEPTUNE E-SEP SMOKE EVACUATION PENCIL, COATED, 70MM BLADE, PUSH BUTTON SWITCH: Brand: NEPTUNE E-SEP

## (undated) DEVICE — DRAPE,UTILITY,XL,4/PK,STERILE: Brand: MEDLINE

## (undated) DEVICE — SURGICAL SUCTION CONNECTING TUBE WITH MALE CONNECTOR AND SUCTION CLAMP, 2 FT. LONG (.6 M), 5 MM I.D.: Brand: CONMED

## (undated) DEVICE — 1LYRTR 16FR10ML100%SIL UMS SNP: Brand: MEDLINE INDUSTRIES, INC.

## (undated) DEVICE — SUTURE PROL SZ 5-0 L36IN NONABSORBABLE BLU L13MM C-1 3/8 8720H

## (undated) DEVICE — PROVE COVER: Brand: UNBRANDED

## (undated) DEVICE — CATHETER DIAG L65CM DIA5FR 0.035IN 4 PRT BRAID PERFORMA

## (undated) DEVICE — BLADE ES ELASTOMERIC COAT INSUL DURABLE BEND UPTO 90DEG

## (undated) DEVICE — 5F 80 CM LEMAITRE EMBOLECTOMY CATHETER, EIFU: Brand: LEMAITRE EMBOLECTOMY CATHETER

## (undated) DEVICE — LARGE (BLUE) FOR GRAFTS UP TO 10 MM, 20 1/2" / 52 CM (1/PKG): Brand: SCANLAN® VASCULAR TUNNELER SHEATHS AND BULLET TIPS

## (undated) DEVICE — APPLICATOR MEDICATED 26 CC SOLUTION HI LT ORNG CHLORAPREP

## (undated) DEVICE — SYRINGE 20ML LL S/C 50

## (undated) DEVICE — SUTURE ETHILON SZ 4-0 L18IN NONABSORBABLE BLK L19MM PS-2 3/8 1667H

## (undated) DEVICE — 3F 80 CM LEMAITRE EMBOLECTOMY CATHETER, EIFU: Brand: LEMAITRE EMBOLECTOMY CATHETER

## (undated) DEVICE — TUBING SUCT 12FR MAL ALUM SHFT FN CAP VENT UNIV CONN W/ OBT

## (undated) DEVICE — GLOVE SURG SZ 7.5 L11.73IN FNGR THK9.8MIL STRW LTX POLYMER

## (undated) DEVICE — 3M™ IOBAN™ 2 ANTIMICROBIAL INCISE DRAPE 6651EZ: Brand: IOBAN™ 2

## (undated) DEVICE — BOOT SUT STD RED IN BLU SMOOTH RADPQ

## (undated) DEVICE — 4F 80 CM LEMAITRE EMBOLECTOMY CATHETER, EIFU: Brand: LEMAITRE EMBOLECTOMY CATHETER

## (undated) DEVICE — SPONGE LAP W18XL18IN WHT COT 4 PLY FLD STRUNG RADPQ DISP ST 2 PER PACK

## (undated) DEVICE — GLOVE SURG SZ 75 L12IN FNGR THK79MIL GRN LTX FREE

## (undated) DEVICE — DRAPE, SLUSH XL, 44X66, STERILE: Brand: MEDLINE

## (undated) DEVICE — PROTECTOR ULN NRV PUR FOAM HK LOOP STRP ANATOMICALLY

## (undated) DEVICE — STRAP,POSITIONING,KNEE/BODY,FOAM,4X60": Brand: MEDLINE

## (undated) DEVICE — SUTURE PROL SZ 6-0 L24IN NONABSORBABLE BLU L9.3MM BV-1 3/8 8805H

## (undated) DEVICE — MASTISOL ADHESIVE LIQ 2/3ML

## (undated) DEVICE — GLOVE SURG SZ 65 CRM LTX FREE POLYISOPRENE POLYMER BEAD ANTI

## (undated) DEVICE — GLOVE SURG SZ 75 CRM LTX FREE POLYISOPRENE POLYMER BEAD ANTI

## (undated) DEVICE — GOWN,SIRUS,NONRNF,SETINSLV,2XL,18/CS: Brand: MEDLINE

## (undated) DEVICE — STRIP,CLOSURE,WOUND,MEDI-STRIP,1/2X4: Brand: MEDLINE

## (undated) DEVICE — SYRINGE MEDICAL 3ML CLEAR PLASTIC STANDARD NON CONTROL LUERLOCK TIP DISPOSABLE

## (undated) DEVICE — Device

## (undated) DEVICE — BANDAGE,GAUZE,BULKEE II,4.5"X4.1YD,STRL: Brand: MEDLINE

## (undated) DEVICE — TOWEL,OR,DSP,ST,BLUE,DLX,XR,4/PK,20PK/CS: Brand: MEDLINE

## (undated) DEVICE — SYRINGE ONLY,20ML LUER LOCK: Brand: MEDLINE INDUSTRIES, INC.

## (undated) DEVICE — SUTURE NONABSORBABLE MONOFILAMENT 7-0 BV-1 1X24 IN PROLENE 8702H

## (undated) DEVICE — DECANTER BAG 9": Brand: MEDLINE INDUSTRIES, INC.

## (undated) DEVICE — SUTURE MONOCRYL + SZ 4 0 L18IN ABSRB UD PC 3 L16MM 3 8 CIR PRIM MCP845G

## (undated) DEVICE — CONTAINER,SPECIMEN,4OZ,OR STRL: Brand: MEDLINE

## (undated) DEVICE — SET PEN AND LBL AND L BWL LBL PAL PEN AND LBLS PAL700]

## (undated) DEVICE — CATHETER GUID 5FR L70CM 0.038IN W/O HYDRPHLC COAT RADPQ

## (undated) DEVICE — RADIFOCUS GLIDEWIRE ADVANTAGE GUIDEWIRE: Brand: GLIDEWIRE ADVANTAGE

## (undated) DEVICE — SUTURE VICRYL + SZ 2-0 L27IN ABSRB CLR CT-1 1/2 CIR TAPERCUT VCP259H

## (undated) DEVICE — STRAP ARMBRD W1.5XL32IN FOAM STR YET SFT W/ HK AND LOOP

## (undated) DEVICE — GLOVE SURG SZ 7 CRM LTX FREE POLYISOPRENE POLYMER BEAD ANTI

## (undated) DEVICE — 3M™ IOBAN™ 2 ANTIMICROBIAL INCISE DRAPE 6640EZ: Brand: IOBAN™ 2

## (undated) DEVICE — GLOVE SURG SZ 8 L12IN FNGR THK79MIL GRN LTX FREE

## (undated) DEVICE — KIT MICRO INTRO 4FR STIFF 40CM NIGHTENALL TUNGSTEN 7SMT